# Patient Record
Sex: MALE | Race: WHITE | Employment: OTHER | URBAN - METROPOLITAN AREA
[De-identification: names, ages, dates, MRNs, and addresses within clinical notes are randomized per-mention and may not be internally consistent; named-entity substitution may affect disease eponyms.]

---

## 2017-01-06 ENCOUNTER — TELEPHONE (OUTPATIENT)
Dept: INTERNAL MEDICINE CLINIC | Facility: CLINIC | Age: 72
End: 2017-01-06

## 2017-01-06 ENCOUNTER — APPOINTMENT (OUTPATIENT)
Dept: LAB | Facility: HOSPITAL | Age: 72
End: 2017-01-06
Attending: INTERNAL MEDICINE
Payer: MEDICARE

## 2017-01-06 DIAGNOSIS — I48.91 ATRIAL FIBRILLATION, UNSPECIFIED TYPE (HCC): ICD-10-CM

## 2017-01-06 DIAGNOSIS — Z79.899 ENCOUNTER FOR LONG-TERM (CURRENT) USE OF HIGH-RISK MEDICATION: ICD-10-CM

## 2017-01-06 LAB
INR BLD: 1.98 (ref 0.89–1.12)
PSA SERPL DL<=0.01 NG/ML-MCNC: 23.2 SECONDS (ref 12.3–14.8)

## 2017-01-06 PROCEDURE — 85610 PROTHROMBIN TIME: CPT

## 2017-01-06 PROCEDURE — 36415 COLL VENOUS BLD VENIPUNCTURE: CPT

## 2017-01-09 ENCOUNTER — TELEPHONE (OUTPATIENT)
Dept: INTERNAL MEDICINE CLINIC | Facility: CLINIC | Age: 72
End: 2017-01-09

## 2017-01-09 RX ORDER — TADALAFIL 10 MG/1
10 TABLET ORAL
Qty: 10 TABLET | Refills: 1 | Status: SHIPPED | OUTPATIENT
Start: 2017-01-09 | End: 2018-08-28

## 2017-01-09 NOTE — TELEPHONE ENCOUNTER
Pt calling to clarify  Dosage of medications on coumadin reviewed with pt 10mg 3 times a week and 9 mg 4 times a week  Pt also requesting script for cialis [facility] with refill pt aware insurance maybe an issue pt will pay out of pocket.

## 2017-02-01 ENCOUNTER — APPOINTMENT (OUTPATIENT)
Dept: LAB | Facility: HOSPITAL | Age: 72
End: 2017-02-01
Attending: INTERNAL MEDICINE
Payer: MEDICARE

## 2017-02-01 ENCOUNTER — TELEPHONE (OUTPATIENT)
Dept: INTERNAL MEDICINE CLINIC | Facility: CLINIC | Age: 72
End: 2017-02-01

## 2017-02-01 DIAGNOSIS — Z79.899 ENCOUNTER FOR LONG-TERM (CURRENT) USE OF HIGH-RISK MEDICATION: ICD-10-CM

## 2017-02-01 DIAGNOSIS — I48.91 ATRIAL FIBRILLATION, UNSPECIFIED TYPE (HCC): ICD-10-CM

## 2017-02-01 LAB
INR BLD: 2.11 (ref 0.89–1.12)
PSA SERPL DL<=0.01 NG/ML-MCNC: 24.4 SECONDS (ref 12.3–14.8)

## 2017-02-01 PROCEDURE — 36415 COLL VENOUS BLD VENIPUNCTURE: CPT

## 2017-02-01 PROCEDURE — 85610 PROTHROMBIN TIME: CPT

## 2017-02-01 RX ORDER — WARFARIN SODIUM 5 MG/1
TABLET ORAL
Qty: 120 TABLET | Refills: 1 | Status: SHIPPED | OUTPATIENT
Start: 2017-02-01 | End: 2017-07-08

## 2017-02-01 NOTE — TELEPHONE ENCOUNTER
----- Message from Su Correia MD sent at 2/1/2017 11:28 AM CST -----  Reanna Cohw, your INR is in the normal range now. So continue the same dose and recheck it in a month. Last OV 9/26/16, pt scheduled Lab and CPX for 2017.

## 2017-03-06 RX ORDER — CANAGLIFLOZIN 300 MG/1
TABLET, FILM COATED ORAL
Qty: 90 TABLET | Refills: 1 | Status: SHIPPED | OUTPATIENT
Start: 2017-03-06 | End: 2017-09-13

## 2017-03-08 ENCOUNTER — NURSE ONLY (OUTPATIENT)
Dept: INTERNAL MEDICINE CLINIC | Facility: CLINIC | Age: 72
End: 2017-03-08

## 2017-03-08 DIAGNOSIS — Z12.5 SCREENING PSA (PROSTATE SPECIFIC ANTIGEN): ICD-10-CM

## 2017-03-08 DIAGNOSIS — I10 ESSENTIAL HYPERTENSION: ICD-10-CM

## 2017-03-08 DIAGNOSIS — L97.509 TYPE 2 DIABETES MELLITUS WITH FOOT ULCER, UNSPECIFIED LONG TERM INSULIN USE STATUS: ICD-10-CM

## 2017-03-08 DIAGNOSIS — E11.621 TYPE 2 DIABETES MELLITUS WITH FOOT ULCER, UNSPECIFIED LONG TERM INSULIN USE STATUS: ICD-10-CM

## 2017-03-08 DIAGNOSIS — E78.00 PURE HYPERCHOLESTEROLEMIA: ICD-10-CM

## 2017-03-08 DIAGNOSIS — Z00.00 ROUTINE GENERAL MEDICAL EXAMINATION AT A HEALTH CARE FACILITY: Primary | ICD-10-CM

## 2017-03-08 LAB
25-HYDROXYVITAMIN D (TOTAL): 35 NG/ML (ref 30–100)
ALBUMIN SERPL-MCNC: 4.1 G/DL (ref 3.5–4.8)
ALP LIVER SERPL-CCNC: 50 U/L (ref 45–117)
ALT SERPL-CCNC: 30 U/L (ref 17–63)
AMB EXT CHOLESTEROL, TOTAL: 232 MG/DL
AMB EXT LDL CHOLESTEROL, DIRECT: 152 MG/DL
AST SERPL-CCNC: 16 U/L (ref 15–41)
BASOPHILS # BLD AUTO: 0.05 X10(3) UL (ref 0–0.1)
BASOPHILS NFR BLD AUTO: 0.6 %
BILIRUB SERPL-MCNC: 0.4 MG/DL (ref 0.1–2)
BUN BLD-MCNC: 14 MG/DL (ref 8–20)
CALCIUM BLD-MCNC: 9.4 MG/DL (ref 8.3–10.3)
CHLORIDE: 104 MMOL/L (ref 101–111)
CO2: 25 MMOL/L (ref 22–32)
COMPLEXED PSA SERPL-MCNC: 2.29 NG/ML (ref 0.01–4)
CREAT BLD-MCNC: 0.97 MG/DL (ref 0.7–1.3)
CREAT UR-SCNC: 82.5 MG/DL
EOSINOPHIL # BLD AUTO: 0.26 X10(3) UL (ref 0–0.3)
EOSINOPHIL NFR BLD AUTO: 3.3 %
ERYTHROCYTE [DISTWIDTH] IN BLOOD BY AUTOMATED COUNT: 13.1 % (ref 11.5–16)
EST. AVERAGE GLUCOSE BLD GHB EST-MCNC: 163 MG/DL (ref 68–126)
GLUCOSE BLD-MCNC: 135 MG/DL (ref 70–99)
HBA1C MFR BLD HPLC: 7.3 % (ref ?–5.7)
HCT VFR BLD AUTO: 49.9 % (ref 37–53)
HGB BLD-MCNC: 16.2 G/DL (ref 13–17)
IMMATURE GRANULOCYTE COUNT: 0.03 X10(3) UL (ref 0–1)
IMMATURE GRANULOCYTE RATIO %: 0.4 %
LYMPHOCYTES # BLD AUTO: 1.83 X10(3) UL (ref 0.9–4)
LYMPHOCYTES NFR BLD AUTO: 23.4 %
M PROTEIN MFR SERPL ELPH: 7.7 G/DL (ref 6.1–8.3)
MCH RBC QN AUTO: 30.6 PG (ref 27–33.2)
MCHC RBC AUTO-ENTMCNC: 32.5 G/DL (ref 31–37)
MCV RBC AUTO: 94.3 FL (ref 80–99)
MICROALBUMIN UR-MCNC: 2.41 MG/DL
MICROALBUMIN/CREAT 24H UR-RTO: 29.2 UG/MG (ref ?–30)
MONOCYTES # BLD AUTO: 0.76 X10(3) UL (ref 0.1–0.6)
MONOCYTES NFR BLD AUTO: 9.7 %
NEUTROPHIL ABS PRELIM: 4.89 X10 (3) UL (ref 1.3–6.7)
NEUTROPHILS # BLD AUTO: 4.89 X10(3) UL (ref 1.3–6.7)
NEUTROPHILS NFR BLD AUTO: 62.6 %
PLATELET # BLD AUTO: 161 10(3)UL (ref 150–450)
POTASSIUM SERPL-SCNC: 4.6 MMOL/L (ref 3.6–5.1)
RBC # BLD AUTO: 5.29 X10(6)UL (ref 3.8–5.8)
RED CELL DISTRIBUTION WIDTH-SD: 45.1 FL (ref 35.1–46.3)
SODIUM SERPL-SCNC: 138 MMOL/L (ref 136–144)
TSI SER-ACNC: 1.26 MIU/ML (ref 0.35–5.5)
WBC # BLD AUTO: 7.8 X10(3) UL (ref 4–13)

## 2017-03-08 PROCEDURE — 83704 LIPOPROTEIN BLD QUAN PART: CPT | Performed by: INTERNAL MEDICINE

## 2017-03-08 PROCEDURE — 84443 ASSAY THYROID STIM HORMONE: CPT | Performed by: INTERNAL MEDICINE

## 2017-03-08 PROCEDURE — 80053 COMPREHEN METABOLIC PANEL: CPT | Performed by: INTERNAL MEDICINE

## 2017-03-08 PROCEDURE — 93000 ELECTROCARDIOGRAM COMPLETE: CPT | Performed by: INTERNAL MEDICINE

## 2017-03-08 PROCEDURE — 82043 UR ALBUMIN QUANTITATIVE: CPT | Performed by: INTERNAL MEDICINE

## 2017-03-08 PROCEDURE — 82306 VITAMIN D 25 HYDROXY: CPT | Performed by: INTERNAL MEDICINE

## 2017-03-08 PROCEDURE — 99173 VISUAL ACUITY SCREEN: CPT | Performed by: INTERNAL MEDICINE

## 2017-03-08 PROCEDURE — 85025 COMPLETE CBC W/AUTO DIFF WBC: CPT | Performed by: INTERNAL MEDICINE

## 2017-03-08 PROCEDURE — 92551 PURE TONE HEARING TEST AIR: CPT | Performed by: INTERNAL MEDICINE

## 2017-03-08 PROCEDURE — 82570 ASSAY OF URINE CREATININE: CPT | Performed by: INTERNAL MEDICINE

## 2017-03-08 PROCEDURE — 83036 HEMOGLOBIN GLYCOSYLATED A1C: CPT | Performed by: INTERNAL MEDICINE

## 2017-03-13 LAB
HDL CHOLESTEROL: 44 MG/DL
HDL PARTICLE NUMBER: 27.9 UMOL/L
HDL SIZE: 8.5 NM
LARGE HDL PARTICLE NUMBER: 2.4 UMOL/L
LARGE VLDL PARTICLE NUMBER: 9 NMOL/L
LDL CHOLESTEROL: 152 MG/DL
LDL PARTICLE NUMBER BY NMR: 1827 NMOL/L
LDL PARTICLE SIZE: 20.6 NM
LDL SIZE: 20.6 NM
LP INSULIN RESISTANCE SCORE: 89
SMALL LDL PARTICLE NUMBER: 790 NMOL/L
SMALL LDL-P: 790 NMOL/L
TOTAL CHOLESTEROL: 232 MG/DL
TRIGLYCERIDES: 179 MG/DL
VLDL SIZE: 58 NM

## 2017-03-16 ENCOUNTER — OFFICE VISIT (OUTPATIENT)
Dept: INTERNAL MEDICINE CLINIC | Facility: CLINIC | Age: 72
End: 2017-03-16

## 2017-03-16 VITALS
DIASTOLIC BLOOD PRESSURE: 82 MMHG | WEIGHT: 212 LBS | TEMPERATURE: 98 F | BODY MASS INDEX: 27.5 KG/M2 | HEIGHT: 73.5 IN | SYSTOLIC BLOOD PRESSURE: 110 MMHG | RESPIRATION RATE: 16 BRPM | HEART RATE: 64 BPM

## 2017-03-16 DIAGNOSIS — Z00.01 ENCOUNTER FOR GENERAL ADULT MEDICAL EXAMINATION WITH ABNORMAL FINDINGS: Primary | ICD-10-CM

## 2017-03-16 DIAGNOSIS — E04.2 MULTINODULAR GOITER: ICD-10-CM

## 2017-03-16 DIAGNOSIS — I10 ESSENTIAL HYPERTENSION: ICD-10-CM

## 2017-03-16 DIAGNOSIS — E11.65 TYPE 2 DIABETES MELLITUS WITH HYPERGLYCEMIA, WITHOUT LONG-TERM CURRENT USE OF INSULIN (HCC): ICD-10-CM

## 2017-03-16 DIAGNOSIS — Z79.01 LONG TERM (CURRENT) USE OF ANTICOAGULANTS: ICD-10-CM

## 2017-03-16 DIAGNOSIS — H25.091 AGE-RELATED INCIPIENT CATARACT OF RIGHT EYE: ICD-10-CM

## 2017-03-16 DIAGNOSIS — I48.20 CHRONIC ATRIAL FIBRILLATION (HCC): ICD-10-CM

## 2017-03-16 DIAGNOSIS — E78.00 PURE HYPERCHOLESTEROLEMIA: ICD-10-CM

## 2017-03-16 PROBLEM — E11.9 TYPE II DIABETES MELLITUS (HCC): Status: ACTIVE | Noted: 2017-03-16

## 2017-03-16 PROBLEM — H26.9 CATARACT, RIGHT EYE: Status: ACTIVE | Noted: 2017-03-16

## 2017-03-16 PROCEDURE — G0439 PPPS, SUBSEQ VISIT: HCPCS | Performed by: INTERNAL MEDICINE

## 2017-03-16 RX ORDER — ENALAPRIL MALEATE 2.5 MG/1
2.5 TABLET ORAL DAILY
Qty: 90 TABLET | Refills: 3 | Status: SHIPPED | OUTPATIENT
Start: 2017-03-16 | End: 2018-03-05

## 2017-03-16 NOTE — PROGRESS NOTES
Joe name: Patricia Yoon  /Sex/Age:  66year old male  Enc.  Date: 3/16/2017     Visit Information:  CC: Patricia Yoon is here for the [facility] Wellness Exam.  We are happy to be caring for you, Eduar Carlson, and are ready to support your efforts to optimi this lipid panel was done. You will repeat it now that you are back on it.         Lab Results  Component Value Date   CHOLEST 232* 03/08/2017   TRIG 228* 09/23/2014   HDL 44 03/08/2017   * 03/08/2017   VLDL 46* 09/23/2014   TCHDLRATIO 3.7 09/23/201 Oral Tablet 24 Hr Take 0.5 tablets (50 mg total) by mouth once daily. Disp:  Rfl:    WARFARIN SODIUM 1 MG Oral Tab TAKE 4 TABLETS DAILY AS DIRECTED.  Disp: 120 tablet Rfl: 11   [DISCONTINUED] INVOKANA 300 MG Oral Tab TAKE ONE TABLET BY MOUTH DAILY Disp: 90 equivalent per week         Drug Use: No    Sexual Activity: Not on file   Not on file  Other Topics Concern    Caffeine Concern Yes    Exercise Yes     Social History Narrative        HOBBIES: Golf, woodworking      Health Maintenance:  1.  Colonoscopy:201 able to afford your medications?: Yes    Hearing Problems?: No     Functional Status     Hearing Problems?: No    Vision Problems? : No    Difficulty walking?: No    Difficulty dressing or bathing?: No    Problems with daily activities? : No    Memory Prob stool, GERD. Genitourinary: Negative for decreased stream, hesitancy, erectile dysfunction. Musculoskeletal: Negative for myalgias, back pain, joint swelling, arthralgias and gait problem. Exts: No leg or ankle swelling.    Skin: Negative for color juan manuel Date: 03/08/2017   Value: 135* Ref Range 70-99 mg/dL Status: Final   BUN Date: 03/08/2017   Value: 14  Ref Range 8-20 mg/dL Status: Final   Creatinine Date: 03/08/2017   Value: 0.97  Ref Range 0.70-1.30 mg/dL Status: Final   GFR Date: 03/08/2017   Value: 7 formula recommended by the American Diabetes Association. eAG levels reflect the long term average glucose and may not correlate with random or fasting glucose levels since these represent specific points in time.           TSH Date: 03/08/2017   Value: 1.2 Final    Comment:  ----------------------------------------------------------                   ** INTERPRETATIVE INFORMATION**                   PARTICLE CONCENTRATION AND SIZE                      <--Lower CVD Risk   Higher CVD Risk-->    LDL AND HDL PAR [address]  [phone]  Madelin Benoit MD  Performed at: [facility] [place] [address], [place], [address] [place] [address]   Triglycerides Date: 03/08/2017   Value: 179* Ref Range 0-149 mg/dL Status: Final    Comment: [address] [id number] [address], [facility] Minor [facility]p Insulin Resistance Score Date: 03/08/2017   Value: 89* Ref Range <=45 Status: Final    Comment:  ----------------------------------------------------------               INSULIN RESISTANCE / DIABETES RISK MARKERS clinical assessment. Neither  the LP-IR score nor the subclasses listed above have been cleared  by the [employer] and Drug Administration.   [facility] [address]  [phone]  Ever Pedro 03/08/2017   Value: 7.8  Ref Range 4.0-13.0 x10(3) uL Status: Final   RBC Date: 03/08/2017   Value: 5.29  Ref Range 3.80-5.80 x10(6)uL Status: Final   HGB Date: 03/08/2017   Value: 16.2  Ref Range 13.0-17.0 g/dL Status: Final   HCT Date: 03/08/2017   Value (primary encounter diagnosis)  Plan: CHARY PE    (I10) Essential hypertension, controlled  Plan: Continue Metoprolol    (I48.2) Chronic atrial fibrillation ([address] rate controlled  (Z79.01) Long term (current) use of anticoagulants  Plan: Continue Metolprol these years, Mehdi De La O. I wish you continued good health and happiness in the coming years. Discussion/Summary:  Patient/Caregiver Education: There are no barriers to learning. Medical education done. Nutritional education done.   Outcome: Patient

## 2017-03-22 ENCOUNTER — APPOINTMENT (OUTPATIENT)
Dept: LAB | Facility: HOSPITAL | Age: 72
End: 2017-03-22
Attending: INTERNAL MEDICINE
Payer: MEDICARE

## 2017-03-22 DIAGNOSIS — E78.00 PURE HYPERCHOLESTEROLEMIA: ICD-10-CM

## 2017-03-22 PROCEDURE — 36415 COLL VENOUS BLD VENIPUNCTURE: CPT

## 2017-03-22 PROCEDURE — 83704 LIPOPROTEIN BLD QUAN PART: CPT

## 2017-03-24 LAB
HDL CHOLESTEROL: 37 MG/DL
HDL PARTICLE NUMBER: 25.7 UMOL/L
HDL SIZE: 8.4 NM
LARGE HDL PARTICLE NUMBER: 1.7 UMOL/L
LARGE VLDL PARTICLE NUMBER: 11.3 NMOL/L
LDL CHOLESTEROL: 104 MG/DL
LDL PARTICLE NUMBER BY NMR: 1710 NMOL/L
LDL PARTICLE SIZE: 20 NM
LDL SIZE: 20 NM
LP INSULIN RESISTANCE SCORE: 91
SMALL LDL PARTICLE NUMBER: 1193 NMOL/L
SMALL LDL-P: 1193 NMOL/L
TOTAL CHOLESTEROL: 182 MG/DL
TRIGLYCERIDES: 203 MG/DL
VLDL SIZE: 56.1 NM

## 2017-05-01 RX ORDER — GLIPIZIDE 10 MG/1
TABLET, FILM COATED, EXTENDED RELEASE ORAL
Qty: 180 TABLET | Refills: 0 | Status: SHIPPED | OUTPATIENT
Start: 2017-05-01 | End: 2017-07-26

## 2017-05-18 ENCOUNTER — APPOINTMENT (OUTPATIENT)
Dept: LAB | Age: 72
End: 2017-05-18
Attending: INTERNAL MEDICINE
Payer: MEDICARE

## 2017-05-18 ENCOUNTER — OFFICE VISIT (OUTPATIENT)
Dept: INTERNAL MEDICINE CLINIC | Facility: CLINIC | Age: 72
End: 2017-05-18

## 2017-05-18 VITALS
TEMPERATURE: 98 F | SYSTOLIC BLOOD PRESSURE: 96 MMHG | HEIGHT: 75 IN | RESPIRATION RATE: 16 BRPM | BODY MASS INDEX: 26.49 KG/M2 | DIASTOLIC BLOOD PRESSURE: 62 MMHG | HEART RATE: 64 BPM | WEIGHT: 213 LBS

## 2017-05-18 DIAGNOSIS — Z79.01 CHRONIC ANTICOAGULATION: ICD-10-CM

## 2017-05-18 DIAGNOSIS — E11.9 TYPE 2 DIABETES MELLITUS WITHOUT COMPLICATION, WITHOUT LONG-TERM CURRENT USE OF INSULIN (HCC): ICD-10-CM

## 2017-05-18 DIAGNOSIS — I48.20 CHRONIC ATRIAL FIBRILLATION (HCC): ICD-10-CM

## 2017-05-18 DIAGNOSIS — Z12.11 ENCOUNTER FOR SCREENING COLONOSCOPY: ICD-10-CM

## 2017-05-18 DIAGNOSIS — I48.20 CHRONIC ATRIAL FIBRILLATION (HCC): Primary | ICD-10-CM

## 2017-05-18 DIAGNOSIS — E78.00 PURE HYPERCHOLESTEROLEMIA: ICD-10-CM

## 2017-05-18 PROCEDURE — 85610 PROTHROMBIN TIME: CPT

## 2017-05-18 PROCEDURE — 99214 OFFICE O/P EST MOD 30 MIN: CPT | Performed by: INTERNAL MEDICINE

## 2017-05-18 NOTE — PROGRESS NOTES
Anil Jackson is a 67year old male.   Patient presents with:  Establish Care: Colonoscopy due  Diabetes: No refills needed, No diabetic retnopathy   High Cholesterol: on crestor but last labs done off medication for a few weeks (he was travelling)      HPI needed for Erectile Dysfunction. Disp: 10 tablet Rfl: 1   JANUMET  MG Oral Tab TAKE 1 TABLET BY MOUTH 2 (TWO) TIMES DAILY WITH MEALS.  Disp: 180 tablet Rfl: 1   Metoprolol Succinate  MG Oral Tablet 24 Hr Take 0.5 tablets (50 mg total) by mouth GENERAL HEALTH: no fevers or chills  SKIN: denies any unusual skin lesions or rashes  RESPIRATORY: no cough  CARDIOVASCULAR: denies chest pain   GI: denies abdominal pain   : no dysuria  MUSCULOSKELETAL:  No arthralgias or myalgias  NEURO: denies heada visit.      The patient indicates understanding of these issues and agrees to the plan.

## 2017-05-31 RX ORDER — ROSUVASTATIN CALCIUM 20 MG/1
TABLET, COATED ORAL
Qty: 90 TABLET | Refills: 3 | Status: SHIPPED | OUTPATIENT
Start: 2017-05-31 | End: 2018-05-31

## 2017-07-07 ENCOUNTER — APPOINTMENT (OUTPATIENT)
Dept: LAB | Age: 72
End: 2017-07-07
Attending: INTERNAL MEDICINE
Payer: MEDICARE

## 2017-07-07 DIAGNOSIS — Z79.01 CHRONIC ANTICOAGULATION: ICD-10-CM

## 2017-07-07 DIAGNOSIS — I48.20 CHRONIC ATRIAL FIBRILLATION (HCC): ICD-10-CM

## 2017-07-07 LAB
INR BLD: 1.96 (ref 0.89–1.11)
PSA SERPL DL<=0.01 NG/ML-MCNC: 22.6 SECONDS (ref 12–14.3)

## 2017-07-07 PROCEDURE — 85610 PROTHROMBIN TIME: CPT

## 2017-07-08 ENCOUNTER — TELEPHONE (OUTPATIENT)
Dept: INTERNAL MEDICINE CLINIC | Facility: CLINIC | Age: 72
End: 2017-07-08

## 2017-07-08 RX ORDER — WARFARIN SODIUM 5 MG/1
TABLET ORAL
Qty: 120 TABLET | Refills: 1 | Status: SHIPPED | OUTPATIENT
Start: 2017-07-08 | End: 2017-09-13

## 2017-07-08 NOTE — TELEPHONE ENCOUNTER
Spoke with patient and informed to change coumadin dose to 10mg 4 times weekly and 9 mg 3 times weekly. Recheck INR in 2 weeks. Patient verbalized understanding. Refill for 5 mg warfarin tabs provided as requested.

## 2017-07-24 ENCOUNTER — APPOINTMENT (OUTPATIENT)
Dept: LAB | Age: 72
End: 2017-07-24
Attending: INTERNAL MEDICINE
Payer: MEDICARE

## 2017-07-24 DIAGNOSIS — I48.20 CHRONIC ATRIAL FIBRILLATION (HCC): ICD-10-CM

## 2017-07-24 DIAGNOSIS — E11.9 TYPE 2 DIABETES MELLITUS WITHOUT COMPLICATION, WITHOUT LONG-TERM CURRENT USE OF INSULIN (HCC): ICD-10-CM

## 2017-07-24 DIAGNOSIS — Z79.01 CHRONIC ANTICOAGULATION: ICD-10-CM

## 2017-07-24 DIAGNOSIS — E78.00 PURE HYPERCHOLESTEROLEMIA: ICD-10-CM

## 2017-07-24 LAB
CHOLEST SMN-MCNC: 162 MG/DL (ref ?–200)
EST. AVERAGE GLUCOSE BLD GHB EST-MCNC: 154 MG/DL (ref 68–126)
HBA1C MFR BLD HPLC: 7 % (ref ?–5.7)
HDLC SERPL-MCNC: 45 MG/DL (ref 45–?)
HDLC SERPL: 3.6 {RATIO} (ref ?–4.97)
INR BLD: 1.92 (ref 0.89–1.11)
LDLC SERPL CALC-MCNC: 84 MG/DL (ref ?–130)
LDLC SERPL-MCNC: 33 MG/DL (ref 5–40)
NONHDLC SERPL-MCNC: 117 MG/DL (ref ?–130)
PSA SERPL DL<=0.01 NG/ML-MCNC: 22.2 SECONDS (ref 12–14.3)
TRIGLYCERIDES: 167 MG/DL (ref ?–150)

## 2017-07-24 PROCEDURE — 83036 HEMOGLOBIN GLYCOSYLATED A1C: CPT

## 2017-07-24 PROCEDURE — 85610 PROTHROMBIN TIME: CPT

## 2017-07-24 PROCEDURE — 80061 LIPID PANEL: CPT

## 2017-07-25 RX ORDER — WARFARIN SODIUM 1 MG/1
TABLET ORAL
Qty: 180 TABLET | Refills: 1 | Status: SHIPPED | OUTPATIENT
Start: 2017-07-25 | End: 2018-06-12

## 2017-07-25 RX ORDER — GLIPIZIDE 10 MG/1
TABLET, FILM COATED, EXTENDED RELEASE ORAL
OUTPATIENT
Start: 2017-07-25

## 2017-07-26 RX ORDER — GLIPIZIDE 10 MG/1
TABLET, FILM COATED, EXTENDED RELEASE ORAL
Qty: 180 TABLET | Refills: 1 | Status: SHIPPED | OUTPATIENT
Start: 2017-07-26 | End: 2018-01-26

## 2017-07-26 NOTE — TELEPHONE ENCOUNTER
LOV: 5/18/17   FOV: nfv  LAST LABS: 7/24/17 a1c 7.0  LAST RX: 5/1/17 #180 no refills   PER PROTOCOL:

## 2017-08-16 ENCOUNTER — APPOINTMENT (OUTPATIENT)
Dept: LAB | Age: 72
End: 2017-08-16
Attending: INTERNAL MEDICINE
Payer: MEDICARE

## 2017-08-16 DIAGNOSIS — Z79.01 CHRONIC ANTICOAGULATION: ICD-10-CM

## 2017-08-16 DIAGNOSIS — I48.20 CHRONIC ATRIAL FIBRILLATION (HCC): ICD-10-CM

## 2017-08-16 LAB
INR BLD: 2.05 (ref 0.89–1.11)
PSA SERPL DL<=0.01 NG/ML-MCNC: 23.4 SECONDS (ref 12–14.3)

## 2017-08-16 PROCEDURE — 85610 PROTHROMBIN TIME: CPT

## 2017-09-13 ENCOUNTER — APPOINTMENT (OUTPATIENT)
Dept: LAB | Age: 72
End: 2017-09-13
Attending: INTERNAL MEDICINE
Payer: MEDICARE

## 2017-09-13 ENCOUNTER — OFFICE VISIT (OUTPATIENT)
Dept: INTERNAL MEDICINE CLINIC | Facility: CLINIC | Age: 72
End: 2017-09-13

## 2017-09-13 VITALS
RESPIRATION RATE: 16 BRPM | TEMPERATURE: 98 F | WEIGHT: 207 LBS | SYSTOLIC BLOOD PRESSURE: 110 MMHG | DIASTOLIC BLOOD PRESSURE: 60 MMHG | BODY MASS INDEX: 25.74 KG/M2 | HEART RATE: 76 BPM | HEIGHT: 75 IN

## 2017-09-13 DIAGNOSIS — Z23 NEED FOR INFLUENZA VACCINATION: ICD-10-CM

## 2017-09-13 DIAGNOSIS — I10 ESSENTIAL HYPERTENSION: ICD-10-CM

## 2017-09-13 DIAGNOSIS — I48.20 CHRONIC ATRIAL FIBRILLATION (HCC): ICD-10-CM

## 2017-09-13 DIAGNOSIS — E78.5 DYSLIPIDEMIA: ICD-10-CM

## 2017-09-13 DIAGNOSIS — F17.290 CIGAR SMOKER: ICD-10-CM

## 2017-09-13 DIAGNOSIS — E11.9 TYPE 2 DIABETES MELLITUS WITHOUT COMPLICATION, WITHOUT LONG-TERM CURRENT USE OF INSULIN (HCC): Primary | ICD-10-CM

## 2017-09-13 LAB
INR BLD: 2.08 (ref 0.89–1.11)
PSA SERPL DL<=0.01 NG/ML-MCNC: 23.7 SECONDS (ref 12–14.3)

## 2017-09-13 PROCEDURE — 99214 OFFICE O/P EST MOD 30 MIN: CPT | Performed by: INTERNAL MEDICINE

## 2017-09-13 PROCEDURE — 85610 PROTHROMBIN TIME: CPT

## 2017-09-13 PROCEDURE — 90653 IIV ADJUVANT VACCINE IM: CPT | Performed by: INTERNAL MEDICINE

## 2017-09-13 PROCEDURE — G0008 ADMIN INFLUENZA VIRUS VAC: HCPCS | Performed by: INTERNAL MEDICINE

## 2017-09-13 RX ORDER — WARFARIN SODIUM 5 MG/1
TABLET ORAL
Qty: 120 TABLET | Refills: 1 | Status: SHIPPED | OUTPATIENT
Start: 2017-09-13 | End: 2018-01-26

## 2017-09-13 NOTE — PROGRESS NOTES
Kristen Rios is a 67year old male. Patient presents with:   Follow - Up: for Cholesterol  Diabetes  Atrial Fibrillation      HPI:     Patient with DM2, dyslipidemia, chronic a fib here for f/u-  Chronic a fib-  on coumadin 10mg 5 days a week and 9mg 2 da MG Oral Tab Take 1 tablet (10 mg total) by mouth daily as needed for Erectile Dysfunction. Disp: 10 tablet Rfl: 1   Metoprolol Succinate  MG Oral Tablet 24 Hr Take 0.5 tablets (50 mg total) by mouth once daily.  Disp:  Rfl:    Clobetasol Propionate (T denies chest pain on exertion, no palpatations  GI: denies abdominal pain   : no dysuria, hematuria  NEURO: denies headaches, dizziness, focal weakness  PSYCH: No reported depression or anxiety  HEME: No adenopathy      EXAM:   /60 (BP Location: [facility]    Return in about 3 months (around 12/13/2017). There are no Patient Instructions on file for this visit. The patient indicates understanding of these issues and agrees to the plan.

## 2017-10-10 ENCOUNTER — TELEPHONE (OUTPATIENT)
Dept: INTERNAL MEDICINE CLINIC | Facility: CLINIC | Age: 72
End: 2017-10-10

## 2017-10-10 NOTE — TELEPHONE ENCOUNTER
[facility] questioning if Coumadin can be held 4 days prior to c-scope. Date of procedure is still to be determined. Please advise, per GI, ASA does not need to be help. Please review form and sing so it can be faxed back.

## 2017-10-10 NOTE — TELEPHONE ENCOUNTER
Per TB, can stop Coumadin 4 days prior, to resume the day after c-scope. Form faxed back to [facility] then sent to scan.

## 2017-11-06 ENCOUNTER — LAB ENCOUNTER (OUTPATIENT)
Dept: LAB | Age: 72
End: 2017-11-06
Attending: INTERNAL MEDICINE
Payer: MEDICARE

## 2017-11-06 DIAGNOSIS — Z79.01 CHRONIC ANTICOAGULATION: ICD-10-CM

## 2017-11-06 DIAGNOSIS — I48.20 CHRONIC ATRIAL FIBRILLATION (HCC): ICD-10-CM

## 2017-11-06 PROCEDURE — 85610 PROTHROMBIN TIME: CPT

## 2017-12-27 ENCOUNTER — LAB ENCOUNTER (OUTPATIENT)
Dept: LAB | Age: 72
End: 2017-12-27
Attending: INTERNAL MEDICINE
Payer: MEDICARE

## 2017-12-27 ENCOUNTER — OFFICE VISIT (OUTPATIENT)
Dept: INTERNAL MEDICINE CLINIC | Facility: CLINIC | Age: 72
End: 2017-12-27

## 2017-12-27 VITALS
HEART RATE: 72 BPM | WEIGHT: 217 LBS | DIASTOLIC BLOOD PRESSURE: 66 MMHG | SYSTOLIC BLOOD PRESSURE: 126 MMHG | RESPIRATION RATE: 16 BRPM | HEIGHT: 75 IN | BODY MASS INDEX: 26.98 KG/M2

## 2017-12-27 DIAGNOSIS — E11.9 TYPE 2 DIABETES MELLITUS WITHOUT COMPLICATION, WITHOUT LONG-TERM CURRENT USE OF INSULIN (HCC): ICD-10-CM

## 2017-12-27 DIAGNOSIS — I48.20 CHRONIC ATRIAL FIBRILLATION (HCC): Primary | ICD-10-CM

## 2017-12-27 DIAGNOSIS — I10 ESSENTIAL HYPERTENSION: ICD-10-CM

## 2017-12-27 DIAGNOSIS — I48.20 CHRONIC ATRIAL FIBRILLATION (HCC): ICD-10-CM

## 2017-12-27 PROCEDURE — 99214 OFFICE O/P EST MOD 30 MIN: CPT | Performed by: INTERNAL MEDICINE

## 2017-12-27 PROCEDURE — 83036 HEMOGLOBIN GLYCOSYLATED A1C: CPT

## 2017-12-27 PROCEDURE — 85610 PROTHROMBIN TIME: CPT

## 2017-12-27 NOTE — PROGRESS NOTES
Magui Owen is a 67year old male. Patient presents with:   Follow - Up      HPI:     Patient here for f/u-  Chronic a fib-   INR due now, no bleeding problems, no cardiac complaints  DM2 - HGBA1C improved to 7 last OV, did not bring his log but says FBG Tablet 24 Hr Take 0.5 tablets (50 mg total) by mouth once daily. Disp:  Rfl:    Clobetasol Propionate (TEMOVATE) 0.05 % Apply Externally Ointment Apply 1 Application topically as needed.  Disp:  Rfl: 0   Omega-3 Fatty Acids (FISH OIL CONCENTRATE OR) Take 1 headaches  RHEUM: No reported joint swelling  HEME: No adenopathy      EXAM:   /66   Pulse 72   Resp 16   Ht 75\"   Wt 217 lb   BMI 27.12 kg/m²   GENERAL: well developed, well nourished,in no apparent distress  SKIN: no rashes,no suspicious lesions

## 2018-01-26 DIAGNOSIS — I48.20 CHRONIC ATRIAL FIBRILLATION (HCC): ICD-10-CM

## 2018-01-26 RX ORDER — GLIPIZIDE 10 MG/1
TABLET, FILM COATED, EXTENDED RELEASE ORAL
Qty: 180 TABLET | Refills: 0 | Status: SHIPPED | OUTPATIENT
Start: 2018-01-26 | End: 2018-05-03

## 2018-01-26 RX ORDER — WARFARIN SODIUM 5 MG/1
TABLET ORAL
Qty: 120 TABLET | Refills: 1 | Status: SHIPPED | OUTPATIENT
Start: 2018-01-26 | End: 2018-06-30

## 2018-01-26 NOTE — TELEPHONE ENCOUNTER
LOV: 12/27/17  Future office visit: No upcoming visit   Last labs: 7/24/17 A1C Lipid 12/27/17 PT A1C   Last RX: Warfarin 9/13/17 #120 #1 Refill  Per protocol: Route to provider

## 2018-03-05 DIAGNOSIS — E11.9 TYPE 2 DIABETES MELLITUS WITHOUT COMPLICATION, WITHOUT LONG-TERM CURRENT USE OF INSULIN (HCC): ICD-10-CM

## 2018-03-05 RX ORDER — CANAGLIFLOZIN 300 MG/1
1 TABLET, FILM COATED ORAL
Qty: 90 TABLET | Refills: 0 | Status: SHIPPED | OUTPATIENT
Start: 2018-03-05 | End: 2018-06-11

## 2018-03-05 RX ORDER — ENALAPRIL MALEATE 2.5 MG/1
2.5 TABLET ORAL DAILY
Qty: 90 TABLET | Refills: 0 | Status: SHIPPED | OUTPATIENT
Start: 2018-03-05 | End: 2018-05-31

## 2018-03-12 ENCOUNTER — TELEPHONE (OUTPATIENT)
Dept: INTERNAL MEDICINE CLINIC | Facility: CLINIC | Age: 73
End: 2018-03-12

## 2018-03-12 NOTE — TELEPHONE ENCOUNTER
AWV with Dr Antoinette Gould on 3/23/18. Patient wants to know if he needs fasting labs done before hand? If so, please send orders to [facility]. Patient would like a callback. Can leave voice message on home machine.

## 2018-03-12 NOTE — TELEPHONE ENCOUNTER
I will decide on labs after OV, let patient know  Medicare does not allow screening labs so I like to do labs after visit to link diagnosis with labs

## 2018-03-19 ENCOUNTER — MED REC SCAN ONLY (OUTPATIENT)
Dept: INTERNAL MEDICINE CLINIC | Facility: CLINIC | Age: 73
End: 2018-03-19

## 2018-03-23 ENCOUNTER — OFFICE VISIT (OUTPATIENT)
Dept: INTERNAL MEDICINE CLINIC | Facility: CLINIC | Age: 73
End: 2018-03-23

## 2018-03-23 VITALS
TEMPERATURE: 98 F | RESPIRATION RATE: 12 BRPM | WEIGHT: 215 LBS | SYSTOLIC BLOOD PRESSURE: 120 MMHG | BODY MASS INDEX: 26.73 KG/M2 | HEIGHT: 75 IN | DIASTOLIC BLOOD PRESSURE: 76 MMHG | HEART RATE: 72 BPM

## 2018-03-23 DIAGNOSIS — Z12.5 ENCOUNTER FOR SPECIAL SCREENING EXAMINATION FOR NEOPLASM OF PROSTATE: ICD-10-CM

## 2018-03-23 DIAGNOSIS — Z00.00 WELLNESS EXAMINATION: Primary | ICD-10-CM

## 2018-03-23 DIAGNOSIS — F17.290 CIGAR SMOKER: ICD-10-CM

## 2018-03-23 DIAGNOSIS — E11.40 TYPE 2 DIABETES MELLITUS WITH DIABETIC NEUROPATHY, WITHOUT LONG-TERM CURRENT USE OF INSULIN (HCC): ICD-10-CM

## 2018-03-23 DIAGNOSIS — I48.20 CHRONIC ATRIAL FIBRILLATION (HCC): ICD-10-CM

## 2018-03-23 DIAGNOSIS — I10 ESSENTIAL HYPERTENSION: ICD-10-CM

## 2018-03-23 DIAGNOSIS — E78.5 DYSLIPIDEMIA: ICD-10-CM

## 2018-03-23 PROCEDURE — G0439 PPPS, SUBSEQ VISIT: HCPCS | Performed by: INTERNAL MEDICINE

## 2018-03-23 NOTE — PROGRESS NOTES
Deven Callaway is a 67year old male who presents for a Medicare Annual Wellness visit.     Patient here for wellness, feels great  Chronic a fib-   no bleeding problems on coumadin, no cardiac complaints, INR due now, plans to see Dr. Ousmane John in summer for (TWO) TIMES DAILY WITH MEALS. Disp: 180 tablet Rfl: 1   Warfarin Sodium 1 MG Oral Tab Take as directed by physician.  Disp: 180 tablet Rfl: 1   Rosuvastatin Calcium (CRESTOR) 20 MG Oral Tab TAKE ONE TABLET BY MOUTH ONCE DAILY Disp: 90 tablet Rfl: 3   [facility]      In the past six months, have you lost more than 10 pounds without trying?: 2 - No  Has your appetite been poor?: No  Type of Diet: Balanced  How does the patient maintain a good energy level?: Appropriate Exercise  How would you Diabetes Screening     HbgA1C   Annually HGBA1C (%)   Date Value   11/01/2011 7.3 (H)   11/01/2011 7.3 (H)     HgbA1C (%)   Date Value   12/27/2017 8.0 (H)       Fasting Blood Sugar (FSB)Annually   Glucose (mg/dL)   Date Value   03/08/2017 135 (H)   ---- entered on: 3/19/2018   Last Dilated Eye Exam 3/19/2018              ALLERGIES:   No Known Allergies  MEDICAL INFORMATION:   Past Medical History:   Diagnosis Date   • Atrial fibrillation ([facility]    • Basal cell carcinoma of chin 8/17/2015   • Cellulitis of 03/12/2015      Zoster Vaccine Live (Zostavax)                          01/01/2010        SOCIAL HISTORY:   Smoking status: Current Some Day Smoker                                                    Packs/day: 0.00      Years: 0.00      Smokeless tobacco: back  EXTREMITIES: no cyanosis, clubbing or edema  Bilateral barefoot skin diabetic exam is normal, visualized feet and the appearance is normal.  Bilateral monofilament/sensation of both feet is abnormal. +onychomycosis, numbness from ankles down in both preventive care reminders to display for this patient.

## 2018-04-25 ENCOUNTER — LAB ENCOUNTER (OUTPATIENT)
Dept: LAB | Age: 73
End: 2018-04-25
Attending: INTERNAL MEDICINE
Payer: MEDICARE

## 2018-04-25 DIAGNOSIS — I10 ESSENTIAL HYPERTENSION: ICD-10-CM

## 2018-04-25 DIAGNOSIS — E11.40 TYPE 2 DIABETES MELLITUS WITH DIABETIC NEUROPATHY, WITHOUT LONG-TERM CURRENT USE OF INSULIN (HCC): ICD-10-CM

## 2018-04-25 DIAGNOSIS — E78.5 DYSLIPIDEMIA: ICD-10-CM

## 2018-04-25 DIAGNOSIS — F17.290 CIGAR SMOKER: ICD-10-CM

## 2018-04-25 DIAGNOSIS — Z12.5 ENCOUNTER FOR SPECIAL SCREENING EXAMINATION FOR NEOPLASM OF PROSTATE: ICD-10-CM

## 2018-04-25 DIAGNOSIS — I48.20 CHRONIC ATRIAL FIBRILLATION (HCC): ICD-10-CM

## 2018-04-25 PROCEDURE — 82570 ASSAY OF URINE CREATININE: CPT

## 2018-04-25 PROCEDURE — 85610 PROTHROMBIN TIME: CPT

## 2018-04-25 PROCEDURE — 84443 ASSAY THYROID STIM HORMONE: CPT

## 2018-04-25 PROCEDURE — 82043 UR ALBUMIN QUANTITATIVE: CPT

## 2018-04-25 PROCEDURE — 80061 LIPID PANEL: CPT

## 2018-04-25 PROCEDURE — 83036 HEMOGLOBIN GLYCOSYLATED A1C: CPT

## 2018-04-25 PROCEDURE — 85025 COMPLETE CBC W/AUTO DIFF WBC: CPT

## 2018-04-25 PROCEDURE — 80053 COMPREHEN METABOLIC PANEL: CPT

## 2018-04-30 ENCOUNTER — TELEPHONE (OUTPATIENT)
Dept: INTERNAL MEDICINE CLINIC | Facility: CLINIC | Age: 73
End: 2018-04-30

## 2018-05-03 RX ORDER — GLIPIZIDE 10 MG/1
TABLET, FILM COATED, EXTENDED RELEASE ORAL
Qty: 180 TABLET | Refills: 0 | Status: SHIPPED | OUTPATIENT
Start: 2018-05-03 | End: 2018-07-23

## 2018-05-03 NOTE — TELEPHONE ENCOUNTER
LOV: 03/23/2018  Future Visit: No appointment scheduled   Last Labs: 04/25/2018 Hemoglobin A1C, PSA, TSH, CBC, Lipid panel, COMP, Microalb, Prothrombin   Last Rx: 01/26/2018      Per protocol: Last HgBA1C < 7.5

## 2018-05-10 ENCOUNTER — APPOINTMENT (OUTPATIENT)
Dept: LAB | Age: 73
End: 2018-05-10
Attending: INTERNAL MEDICINE
Payer: MEDICARE

## 2018-05-10 DIAGNOSIS — Z79.01 CHRONIC ANTICOAGULATION: ICD-10-CM

## 2018-05-10 DIAGNOSIS — I48.20 CHRONIC ATRIAL FIBRILLATION (HCC): ICD-10-CM

## 2018-05-10 PROCEDURE — 85610 PROTHROMBIN TIME: CPT

## 2018-05-31 RX ORDER — ROSUVASTATIN CALCIUM 20 MG/1
TABLET, COATED ORAL
Qty: 90 TABLET | Refills: 0 | Status: SHIPPED | OUTPATIENT
Start: 2018-05-31 | End: 2018-08-28

## 2018-05-31 RX ORDER — ENALAPRIL MALEATE 2.5 MG/1
2.5 TABLET ORAL DAILY
Qty: 90 TABLET | Refills: 0 | Status: SHIPPED | OUTPATIENT
Start: 2018-05-31 | End: 2018-09-07

## 2018-06-11 DIAGNOSIS — E11.9 TYPE 2 DIABETES MELLITUS WITHOUT COMPLICATION, WITHOUT LONG-TERM CURRENT USE OF INSULIN (HCC): ICD-10-CM

## 2018-06-11 RX ORDER — CANAGLIFLOZIN 300 MG/1
1 TABLET, FILM COATED ORAL
Qty: 90 TABLET | Refills: 0 | Status: SHIPPED | OUTPATIENT
Start: 2018-06-11 | End: 2018-09-07

## 2018-06-11 NOTE — TELEPHONE ENCOUNTER
Protocol failed as a1c not controlled  All others passed  Pt due back this month, no apt on file  Protocol to provider and

## 2018-06-12 RX ORDER — WARFARIN SODIUM 1 MG/1
TABLET ORAL
Qty: 32 TABLET | Refills: 5 | Status: SHIPPED | OUTPATIENT
Start: 2018-06-12 | End: 2018-12-04

## 2018-06-12 NOTE — TELEPHONE ENCOUNTER
[facility]:5/83/4526 with TB  Future Appt: none on file  Last Rx:7/25/2017 #180 w/1  Last Labs: 5/10/2018 INR 2.25 dosing of 10mg x 5 days and 9mg x 2 days  Per protocol to provider

## 2018-06-12 NOTE — TELEPHONE ENCOUNTER
Future Appointments  Date Time Provider Marilyn Encisoi   7/10/2018 11:45 AM Coral Caldwell MD [facility]   7/19/2018 1:00 PM Jesús Arango MD [facility]

## 2018-06-30 DIAGNOSIS — I48.20 CHRONIC ATRIAL FIBRILLATION (HCC): ICD-10-CM

## 2018-07-02 RX ORDER — WARFARIN SODIUM 5 MG/1
TABLET ORAL
Qty: 120 TABLET | Refills: 0 | Status: SHIPPED | OUTPATIENT
Start: 2018-07-02 | End: 2018-09-08

## 2018-07-02 NOTE — TELEPHONE ENCOUNTER
LOV: 3/23/2018   LAST LAB: 4/5/2018 PSA, TSH, CBC,   LAST REFILL   1/26/2018 , 120 TABS , 1 REFILL     PER PROTOCOL ROUTE TO PROVIDER

## 2018-07-10 ENCOUNTER — LAB ENCOUNTER (OUTPATIENT)
Dept: LAB | Age: 73
End: 2018-07-10
Attending: INTERNAL MEDICINE
Payer: MEDICARE

## 2018-07-10 ENCOUNTER — OFFICE VISIT (OUTPATIENT)
Dept: INTERNAL MEDICINE CLINIC | Facility: CLINIC | Age: 73
End: 2018-07-10

## 2018-07-10 VITALS
HEIGHT: 75 IN | RESPIRATION RATE: 16 BRPM | HEART RATE: 72 BPM | DIASTOLIC BLOOD PRESSURE: 62 MMHG | BODY MASS INDEX: 25.61 KG/M2 | SYSTOLIC BLOOD PRESSURE: 100 MMHG | TEMPERATURE: 98 F | WEIGHT: 206 LBS

## 2018-07-10 DIAGNOSIS — D69.6 THROMBOCYTOPENIA (HCC): ICD-10-CM

## 2018-07-10 DIAGNOSIS — I48.20 CHRONIC ATRIAL FIBRILLATION (HCC): ICD-10-CM

## 2018-07-10 DIAGNOSIS — I10 ESSENTIAL HYPERTENSION: ICD-10-CM

## 2018-07-10 DIAGNOSIS — Z71.6 ENCOUNTER FOR SMOKING CESSATION COUNSELING: ICD-10-CM

## 2018-07-10 DIAGNOSIS — E11.40 TYPE 2 DIABETES MELLITUS WITH DIABETIC NEUROPATHY, WITHOUT LONG-TERM CURRENT USE OF INSULIN (HCC): Primary | ICD-10-CM

## 2018-07-10 DIAGNOSIS — E11.40 TYPE 2 DIABETES MELLITUS WITH DIABETIC NEUROPATHY, WITHOUT LONG-TERM CURRENT USE OF INSULIN (HCC): ICD-10-CM

## 2018-07-10 LAB
BASOPHILS # BLD AUTO: 0.03 X10(3) UL (ref 0–0.1)
BASOPHILS NFR BLD AUTO: 0.4 %
BUN BLD-MCNC: 20 MG/DL (ref 8–20)
CALCIUM BLD-MCNC: 9.3 MG/DL (ref 8.3–10.3)
CHLORIDE: 102 MMOL/L (ref 101–111)
CO2: 26 MMOL/L (ref 22–32)
CREAT BLD-MCNC: 1.24 MG/DL (ref 0.7–1.3)
EOSINOPHIL # BLD AUTO: 0.24 X10(3) UL (ref 0–0.3)
EOSINOPHIL NFR BLD AUTO: 2.9 %
ERYTHROCYTE [DISTWIDTH] IN BLOOD BY AUTOMATED COUNT: 13 % (ref 11.5–16)
EST. AVERAGE GLUCOSE BLD GHB EST-MCNC: 166 MG/DL (ref 68–126)
GLUCOSE BLD-MCNC: 198 MG/DL (ref 70–99)
HBA1C MFR BLD HPLC: 7.4 % (ref ?–5.7)
HCT VFR BLD AUTO: 48.6 % (ref 37–53)
HGB BLD-MCNC: 16.2 G/DL (ref 13–17)
IMMATURE GRANULOCYTE COUNT: 0.02 X10(3) UL (ref 0–1)
IMMATURE GRANULOCYTE RATIO %: 0.2 %
INR BLD: 2.17 (ref 0.9–1.1)
LYMPHOCYTES # BLD AUTO: 1.84 X10(3) UL (ref 0.9–4)
LYMPHOCYTES NFR BLD AUTO: 22.5 %
MCH RBC QN AUTO: 30.8 PG (ref 27–33.2)
MCHC RBC AUTO-ENTMCNC: 33.3 G/DL (ref 31–37)
MCV RBC AUTO: 92.4 FL (ref 80–99)
MONOCYTES # BLD AUTO: 0.73 X10(3) UL (ref 0.1–1)
MONOCYTES NFR BLD AUTO: 8.9 %
NEUTROPHIL ABS PRELIM: 5.33 X10 (3) UL (ref 1.3–6.7)
NEUTROPHILS # BLD AUTO: 5.33 X10(3) UL (ref 1.3–6.7)
NEUTROPHILS NFR BLD AUTO: 65.1 %
PLATELET # BLD AUTO: 178 10(3)UL (ref 150–450)
POTASSIUM SERPL-SCNC: 4.7 MMOL/L (ref 3.6–5.1)
PSA SERPL DL<=0.01 NG/ML-MCNC: 24.9 SECONDS (ref 12.4–14.7)
RBC # BLD AUTO: 5.26 X10(6)UL (ref 3.8–5.8)
RED CELL DISTRIBUTION WIDTH-SD: 43.9 FL (ref 35.1–46.3)
SODIUM SERPL-SCNC: 136 MMOL/L (ref 136–144)
WBC # BLD AUTO: 8.2 X10(3) UL (ref 4–13)

## 2018-07-10 PROCEDURE — 80048 BASIC METABOLIC PNL TOTAL CA: CPT

## 2018-07-10 PROCEDURE — 83036 HEMOGLOBIN GLYCOSYLATED A1C: CPT

## 2018-07-10 PROCEDURE — 85610 PROTHROMBIN TIME: CPT

## 2018-07-10 PROCEDURE — 99214 OFFICE O/P EST MOD 30 MIN: CPT | Performed by: INTERNAL MEDICINE

## 2018-07-10 PROCEDURE — 85025 COMPLETE CBC W/AUTO DIFF WBC: CPT

## 2018-07-10 NOTE — PROGRESS NOTES
Ledy Shah is a 75year old male. Patient presents with:   Follow - Up: Room 3 AH - DM UTD , a fib, smoking still      HPI:     Patient here for f/u  DM2 - no log of BG with him, last one yesterday 140, intentionally lost weight playing golf and watchin mouth daily as needed for Erectile Dysfunction. Disp: 10 tablet Rfl: 1   Clobetasol Propionate (TEMOVATE) 0.05 % Apply Externally Ointment Apply 1 Application topically as needed.  Disp:  Rfl: 0   Omega-3 Fatty Acids (FISH OIL CONCENTRATE OR) Take 1 tablet 100/62 (BP Location: Left arm, Patient Position: Sitting, Cuff Size: adult)   Pulse 72   Temp 97.7 °F (36.5 °C) (Oral)   Resp 16   Ht 75\"   Wt 206 lb   BMI 25.75 kg/m²   GENERAL: well developed, NAD  HEENT: atraumatic, normocephalic  NECK: supple,no adeno

## 2018-07-23 RX ORDER — GLIPIZIDE 10 MG/1
TABLET, FILM COATED, EXTENDED RELEASE ORAL
Qty: 180 TABLET | Refills: 0 | Status: SHIPPED | OUTPATIENT
Start: 2018-07-23 | End: 2018-11-05

## 2018-08-28 RX ORDER — TADALAFIL 10 MG
10 TABLET ORAL
Qty: 10 TABLET | OUTPATIENT
Start: 2018-08-28

## 2018-08-28 RX ORDER — ROSUVASTATIN CALCIUM 20 MG/1
TABLET, COATED ORAL
Qty: 90 TABLET | Refills: 0 | Status: SHIPPED | OUTPATIENT
Start: 2018-08-28 | End: 2018-11-29

## 2018-08-29 RX ORDER — TADALAFIL 10 MG
10 TABLET ORAL
Qty: 10 TABLET | Refills: 1 | Status: SHIPPED | OUTPATIENT
Start: 2018-08-29 | End: 2018-11-30 | Stop reason: ALTCHOICE

## 2018-09-07 DIAGNOSIS — E11.9 TYPE 2 DIABETES MELLITUS WITHOUT COMPLICATION, WITHOUT LONG-TERM CURRENT USE OF INSULIN (HCC): ICD-10-CM

## 2018-09-07 RX ORDER — ENALAPRIL MALEATE 2.5 MG/1
2.5 TABLET ORAL DAILY
Qty: 90 TABLET | Refills: 0 | Status: SHIPPED | OUTPATIENT
Start: 2018-09-07 | End: 2018-12-04

## 2018-09-07 RX ORDER — CANAGLIFLOZIN 300 MG/1
1 TABLET, FILM COATED ORAL
Qty: 90 TABLET | Refills: 0 | Status: SHIPPED | OUTPATIENT
Start: 2018-09-07 | End: 2018-12-04

## 2018-09-08 DIAGNOSIS — I48.20 CHRONIC ATRIAL FIBRILLATION (HCC): ICD-10-CM

## 2018-09-10 RX ORDER — WARFARIN SODIUM 5 MG/1
TABLET ORAL
Qty: 120 TABLET | Refills: 0 | Status: SHIPPED | OUTPATIENT
Start: 2018-09-10 | End: 2018-11-15

## 2018-09-10 NOTE — TELEPHONE ENCOUNTER
LOV: 07/10/2018  Upcoming: No appointment scheduled  Rx:  07/02/2018  Labs: 07/10/2018 Prothrombin time, CBC.  Basic metabolic panel, Hemoglobin     Per protocol: sent to provider

## 2018-09-12 ENCOUNTER — MED REC SCAN ONLY (OUTPATIENT)
Dept: INTERNAL MEDICINE CLINIC | Facility: CLINIC | Age: 73
End: 2018-09-12

## 2018-09-26 DIAGNOSIS — E11.9 TYPE 2 DIABETES MELLITUS WITHOUT COMPLICATION, WITHOUT LONG-TERM CURRENT USE OF INSULIN (HCC): ICD-10-CM

## 2018-11-05 ENCOUNTER — APPOINTMENT (OUTPATIENT)
Dept: LAB | Age: 73
End: 2018-11-05
Attending: INTERNAL MEDICINE
Payer: MEDICARE

## 2018-11-05 PROCEDURE — 85610 PROTHROMBIN TIME: CPT

## 2018-11-05 RX ORDER — GLIPIZIDE 10 MG/1
TABLET, FILM COATED, EXTENDED RELEASE ORAL
Qty: 180 TABLET | Refills: 0 | Status: SHIPPED | OUTPATIENT
Start: 2018-11-05 | End: 2019-01-25

## 2018-11-15 DIAGNOSIS — I48.20 CHRONIC ATRIAL FIBRILLATION (HCC): ICD-10-CM

## 2018-11-15 RX ORDER — WARFARIN SODIUM 5 MG/1
TABLET ORAL
Qty: 120 TABLET | Refills: 0 | Status: SHIPPED | OUTPATIENT
Start: 2018-11-15 | End: 2019-01-25

## 2018-11-15 NOTE — TELEPHONE ENCOUNTER
[facility]: 07/10/18 with TB  Future Appt: 11/30/2018   Last Rx:09/10/18 for 120 tablets  Last Labs:11/5/18 within range  10mg x 5 days and 9mg x2 days  Per protocol to provider

## 2018-11-29 RX ORDER — ROSUVASTATIN CALCIUM 20 MG/1
TABLET, COATED ORAL
Qty: 90 TABLET | Refills: 0 | Status: SHIPPED | OUTPATIENT
Start: 2018-11-29 | End: 2019-03-03

## 2018-11-30 ENCOUNTER — OFFICE VISIT (OUTPATIENT)
Dept: INTERNAL MEDICINE CLINIC | Facility: CLINIC | Age: 73
End: 2018-11-30
Payer: MEDICARE

## 2018-11-30 VITALS
TEMPERATURE: 98 F | WEIGHT: 204 LBS | BODY MASS INDEX: 25.36 KG/M2 | DIASTOLIC BLOOD PRESSURE: 54 MMHG | SYSTOLIC BLOOD PRESSURE: 104 MMHG | HEIGHT: 75 IN | RESPIRATION RATE: 14 BRPM | HEART RATE: 64 BPM

## 2018-11-30 DIAGNOSIS — E78.5 DYSLIPIDEMIA: ICD-10-CM

## 2018-11-30 DIAGNOSIS — I10 ESSENTIAL HYPERTENSION: ICD-10-CM

## 2018-11-30 DIAGNOSIS — I48.20 CHRONIC ATRIAL FIBRILLATION (HCC): ICD-10-CM

## 2018-11-30 DIAGNOSIS — E11.40 TYPE 2 DIABETES MELLITUS WITH DIABETIC NEUROPATHY, WITHOUT LONG-TERM CURRENT USE OF INSULIN (HCC): Primary | ICD-10-CM

## 2018-11-30 DIAGNOSIS — N52.9 ERECTILE DYSFUNCTION, UNSPECIFIED ERECTILE DYSFUNCTION TYPE: ICD-10-CM

## 2018-11-30 DIAGNOSIS — F17.290 CIGAR SMOKER: ICD-10-CM

## 2018-11-30 PROCEDURE — 99214 OFFICE O/P EST MOD 30 MIN: CPT | Performed by: INTERNAL MEDICINE

## 2018-11-30 PROCEDURE — 83036 HEMOGLOBIN GLYCOSYLATED A1C: CPT | Performed by: INTERNAL MEDICINE

## 2018-11-30 RX ORDER — SILDENAFIL 50 MG/1
50 TABLET, FILM COATED ORAL
Qty: 10 TABLET | Refills: 3 | Status: SHIPPED | OUTPATIENT
Start: 2018-11-30 | End: 2019-10-25

## 2018-11-30 NOTE — PROGRESS NOTES
Billy Cough is a 75year old male. Patient presents with: Follow - Up: routine 3 month f/u. LB-rm 3      HPI:     Patient here for f/u -   DM2 - not checking BG regularly,  compliant with medications, cut out the diet soda, hgba1c improved to 7.3.  He h Rfl: 0   CARTIA  MG Oral Capsule SR 24 Hr TAKE 1 CAPSULE (180 MG TOTAL) BY MOUTH ONCE DAILY.  Disp: 90 capsule Rfl: 3   Warfarin Sodium 1 MG Oral Tab 10mg x 5 days and 9mg x 2 days Disp: 32 tablet Rfl: 5   Clobetasol Propionate (TEMOVATE) 0.05 % Apply denies abdominal pain  : no dysuria  NEURO: denies headaches  PSYCH: No reported depression or anxiety  RHEUM: No reported joint swelling  HEME: No adenopathy      EXAM:   /54 (BP Location: Right arm, Patient Position: Sitting, Cuff Size: adult) Consults:  None    Return in about 3 months (around 3/5/2019). There are no Patient Instructions on file for this visit. The patient indicates understanding of these issues and agrees to the plan.

## 2018-12-04 DIAGNOSIS — E11.9 TYPE 2 DIABETES MELLITUS WITHOUT COMPLICATION, WITHOUT LONG-TERM CURRENT USE OF INSULIN (HCC): ICD-10-CM

## 2018-12-04 RX ORDER — CANAGLIFLOZIN 300 MG/1
1 TABLET, FILM COATED ORAL
Qty: 90 TABLET | Refills: 1 | Status: SHIPPED | OUTPATIENT
Start: 2018-12-04 | End: 2019-06-11

## 2018-12-04 RX ORDER — ENALAPRIL MALEATE 2.5 MG/1
2.5 TABLET ORAL DAILY
Qty: 90 TABLET | Refills: 1 | Status: SHIPPED | OUTPATIENT
Start: 2018-12-04 | End: 2019-06-11

## 2018-12-04 RX ORDER — WARFARIN SODIUM 1 MG/1
TABLET ORAL
Qty: 32 TABLET | Refills: 5 | Status: SHIPPED | OUTPATIENT
Start: 2018-12-04 | End: 2019-05-31

## 2018-12-04 NOTE — TELEPHONE ENCOUNTER
Last Office Visit: 11-30-18 with TB for diabetes  Last Rx Filled: 11-15-18 120 tabs with no refills   Last Labs: 11-30-18 hga1c. 7-10-18 pt/inr/cbc/bmp/hga1c  Future Appointment: none    Per protocol to provider

## 2019-01-14 ENCOUNTER — TELEPHONE (OUTPATIENT)
Dept: INTERNAL MEDICINE CLINIC | Facility: CLINIC | Age: 74
End: 2019-01-14

## 2019-01-14 ENCOUNTER — APPOINTMENT (OUTPATIENT)
Dept: LAB | Age: 74
End: 2019-01-14
Attending: INTERNAL MEDICINE
Payer: MEDICARE

## 2019-01-14 DIAGNOSIS — Z79.01 LONG TERM (CURRENT) USE OF ANTICOAGULANTS: Primary | ICD-10-CM

## 2019-01-14 DIAGNOSIS — Z79.01 LONG TERM (CURRENT) USE OF ANTICOAGULANTS: ICD-10-CM

## 2019-01-14 LAB
INR BLD: 2.33 (ref 0.9–1.1)
PSA SERPL DL<=0.01 NG/ML-MCNC: 26.3 SECONDS (ref 12.4–14.7)

## 2019-01-14 PROCEDURE — 85610 PROTHROMBIN TIME: CPT

## 2019-01-14 NOTE — TELEPHONE ENCOUNTER
Pt is in the waiting room and would like to get labs done for his PT INR needs a standing order placed.  Please add ASAP

## 2019-01-14 NOTE — TELEPHONE ENCOUNTER
Per result note on 11/5/18- pt to recheck INR in 1 month. Not completed. Pt in waiting room, no order in system. INR order placed per written protocol. TB- ok to place standing order for INR? Pended for your approval if appropriate.   Please advise, t

## 2019-01-25 DIAGNOSIS — I48.20 CHRONIC ATRIAL FIBRILLATION (HCC): ICD-10-CM

## 2019-01-25 RX ORDER — WARFARIN SODIUM 5 MG/1
TABLET ORAL
Qty: 120 TABLET | Refills: 0 | Status: SHIPPED | OUTPATIENT
Start: 2019-01-25 | End: 2019-03-31

## 2019-01-25 RX ORDER — GLIPIZIDE 10 MG/1
TABLET, FILM COATED, EXTENDED RELEASE ORAL
Qty: 180 TABLET | Refills: 0 | Status: SHIPPED | OUTPATIENT
Start: 2019-01-25 | End: 2019-05-05

## 2019-01-25 NOTE — TELEPHONE ENCOUNTER
Last Office Visit: 11-30-18 with TB for follow up  Last Rx Filled:   Glipizide 11-5-18 180 tabs with no refills  Warfarin 11-15-18 120 tabs with no refills  Last Labs: 1-14-19 pt/inr. 11-30-18 hga1c  Future Appointment: none    Per protocol to provider

## 2019-03-04 RX ORDER — ROSUVASTATIN CALCIUM 20 MG/1
TABLET, COATED ORAL
Qty: 90 TABLET | Refills: 0 | Status: SHIPPED | OUTPATIENT
Start: 2019-03-04 | End: 2019-05-06

## 2019-03-12 ENCOUNTER — TELEPHONE (OUTPATIENT)
Dept: INTERNAL MEDICINE CLINIC | Facility: CLINIC | Age: 74
End: 2019-03-12

## 2019-03-12 NOTE — TELEPHONE ENCOUNTER
Medicare Wellness   Future Appointments   Date Time Provider Marilyn Monika   5/6/2019  2:15 PM Shama Jackson MD [facility]     Orders to [id number] Moore aware must fast no call back required

## 2019-03-13 DIAGNOSIS — E11.9 TYPE 2 DIABETES MELLITUS WITHOUT COMPLICATION, WITHOUT LONG-TERM CURRENT USE OF INSULIN (HCC): ICD-10-CM

## 2019-03-25 ENCOUNTER — TELEPHONE (OUTPATIENT)
Dept: INTERNAL MEDICINE CLINIC | Facility: CLINIC | Age: 74
End: 2019-03-25

## 2019-03-25 NOTE — TELEPHONE ENCOUNTER
Dm eye exam received dated 3/25/19 completed by Dhruv Pak at [facility] no dm retinopathy noted in repor abstracted and placed in TB's bin to be reviewed

## 2019-03-31 DIAGNOSIS — I48.20 CHRONIC ATRIAL FIBRILLATION (HCC): ICD-10-CM

## 2019-04-01 RX ORDER — WARFARIN SODIUM 5 MG/1
TABLET ORAL
Qty: 120 TABLET | Refills: 0 | Status: SHIPPED | OUTPATIENT
Start: 2019-04-01 | End: 2019-06-15

## 2019-04-01 NOTE — TELEPHONE ENCOUNTER
Last Office Visit: 11-30-18 with TB for follow up   Last Rx Filled: 1-25-19 120 tabs with no refills   Last Labs: 1-14-19 pt/inr. 11-30-18 hga1c  Future Appointment: 5-6-19    Per protocol to provider

## 2019-04-03 ENCOUNTER — MED REC SCAN ONLY (OUTPATIENT)
Dept: INTERNAL MEDICINE CLINIC | Facility: CLINIC | Age: 74
End: 2019-04-03

## 2019-04-05 ENCOUNTER — LAB ENCOUNTER (OUTPATIENT)
Dept: LAB | Age: 74
End: 2019-04-05
Attending: INTERNAL MEDICINE
Payer: MEDICARE

## 2019-04-05 DIAGNOSIS — E78.5 DYSLIPIDEMIA: ICD-10-CM

## 2019-04-05 DIAGNOSIS — E11.40 TYPE 2 DIABETES MELLITUS WITH DIABETIC NEUROPATHY, WITHOUT LONG-TERM CURRENT USE OF INSULIN (HCC): ICD-10-CM

## 2019-04-05 DIAGNOSIS — I10 ESSENTIAL HYPERTENSION: ICD-10-CM

## 2019-04-05 DIAGNOSIS — I48.20 CHRONIC ATRIAL FIBRILLATION (HCC): ICD-10-CM

## 2019-04-05 DIAGNOSIS — Z79.01 LONG TERM (CURRENT) USE OF ANTICOAGULANTS: ICD-10-CM

## 2019-04-05 DIAGNOSIS — F17.290 CIGAR SMOKER: ICD-10-CM

## 2019-04-05 PROCEDURE — 80061 LIPID PANEL: CPT

## 2019-04-05 PROCEDURE — 80053 COMPREHEN METABOLIC PANEL: CPT

## 2019-04-05 PROCEDURE — 82570 ASSAY OF URINE CREATININE: CPT

## 2019-04-05 PROCEDURE — 85025 COMPLETE CBC W/AUTO DIFF WBC: CPT

## 2019-04-05 PROCEDURE — 83036 HEMOGLOBIN GLYCOSYLATED A1C: CPT

## 2019-04-05 PROCEDURE — 85610 PROTHROMBIN TIME: CPT

## 2019-04-05 PROCEDURE — 82043 UR ALBUMIN QUANTITATIVE: CPT

## 2019-04-05 PROCEDURE — 84443 ASSAY THYROID STIM HORMONE: CPT

## 2019-04-05 NOTE — TELEPHONE ENCOUNTER
Pt is here getting other labs done he is going to be out of town today is the only day he can do the labs, Please enter any additional If any. No call back necessary to pt.

## 2019-05-06 ENCOUNTER — OFFICE VISIT (OUTPATIENT)
Dept: INTERNAL MEDICINE CLINIC | Facility: CLINIC | Age: 74
End: 2019-05-06
Payer: MEDICARE

## 2019-05-06 VITALS
HEART RATE: 80 BPM | HEIGHT: 75 IN | TEMPERATURE: 98 F | SYSTOLIC BLOOD PRESSURE: 100 MMHG | WEIGHT: 209 LBS | DIASTOLIC BLOOD PRESSURE: 56 MMHG | BODY MASS INDEX: 25.99 KG/M2

## 2019-05-06 DIAGNOSIS — Z12.5 ENCOUNTER FOR SPECIAL SCREENING EXAMINATION FOR NEOPLASM OF PROSTATE: ICD-10-CM

## 2019-05-06 DIAGNOSIS — Z00.00 ENCOUNTER FOR ANNUAL HEALTH EXAMINATION: Primary | ICD-10-CM

## 2019-05-06 DIAGNOSIS — I48.20 CHRONIC ATRIAL FIBRILLATION (HCC): ICD-10-CM

## 2019-05-06 DIAGNOSIS — E11.40 TYPE 2 DIABETES MELLITUS WITH DIABETIC NEUROPATHY, WITHOUT LONG-TERM CURRENT USE OF INSULIN (HCC): ICD-10-CM

## 2019-05-06 DIAGNOSIS — E78.5 DYSLIPIDEMIA: ICD-10-CM

## 2019-05-06 PROCEDURE — G0439 PPPS, SUBSEQ VISIT: HCPCS | Performed by: INTERNAL MEDICINE

## 2019-05-06 RX ORDER — GLIPIZIDE 10 MG/1
TABLET, FILM COATED, EXTENDED RELEASE ORAL
Qty: 180 TABLET | Refills: 0 | Status: SHIPPED | OUTPATIENT
Start: 2019-05-06 | End: 2019-07-22

## 2019-05-06 RX ORDER — ROSUVASTATIN CALCIUM 20 MG/1
TABLET, COATED ORAL
Qty: 90 TABLET | Refills: 1 | Status: SHIPPED | OUTPATIENT
Start: 2019-05-06 | End: 2019-12-02

## 2019-05-06 NOTE — PROGRESS NOTES
HPI:   Geno Magallanes is a 75year old male who presents for a Medicare Subsequent Annual Wellness visit (Pt already had Initial Annual Wellness).     Patient with chronic a fib on coumadin, DM2, cigar smoker here for wellness  A fib , chronic - sees Dr. Yari Harris (Internal Medicine)  Amos Palm MD as PCP - MSSP  Elmo Albright DPM ([facility])  Adrianne Gusman MD (Cardiovascular Diseases)  Melissa Marroquin MD (DERMATOLOGY)    Patient Active Problem List:     Nontoxic multinodular goiter     Pure hyperchol Sildenafil Citrate 50 MG Oral Tab Take 1 tablet (50 mg total) by mouth daily as needed for Erectile Dysfunction.    METOPROLOL SUCCINATE  MG Oral Tablet 24 Hr TAKE 1/2 TABLET BY MOUTH DAILY   CARTIA  MG Oral Capsule SR 24 Hr TAKE 1 CAPSULE (18 pain or ST  LUNGS: denies shortness of breath with exertion  CARDIOVASCULAR: denies chest pain on exertion  GI: denies abdominal pain, denies heartburn  : 1 per night nocturia, no complaint of urinary incontinence  MUSCULOSKELETAL: denies back pain  NEUR Skin: Skin color, texture, turgor normal, no rashes or lesions   Lymph nodes: Cervical, supraclavicular, and axillary nodes normal   Neurologic: alert     Bilateral barefoot skin diabetic exam is normal, visualized feet and the appearance is normal.  Cliff prostate  -     PSA SCREEN; Future    Dyslipidemia- controlled, CPM  -     Rosuvastatin Calcium 20 MG Oral Tab; TAKE ONE TABLET BY MOUTH ONCE DAILY         Diet assessment: good     PLAN:  The patient indicates understanding of these issues and agrees to t found.    Glaucoma Screening      Ophthalmology Visit Annually: Diabetics, FHx Glaucoma, AA>50, > 65 Data entered on: 3/25/2019   Last Dilated Eye Exam 3/25/2019       Prostate Cancer Screening      PSA  Annually PSA due on 04/25/2020  Update Maria G A1C (%)   Date Value   11/30/2018 7.3 (A)     HgbA1C (%)   Date Value   04/05/2019 7.7 (H)       No flowsheet data found.     Creat/alb ratio  Annually Malb/Cre Calc (ug/mg)   Date Value   04/05/2019 8.0     MALB/CRE CALC (ug/mg)   Date Value   03/15/2011 8

## 2019-05-06 NOTE — PATIENT INSTRUCTIONS
Lanelle Clause SCREENING SCHEDULE   Tests on this list are recommended by your physician but may not be covered, or covered at this frequency, by your insurer. Please check with your insurance carrier before scheduling to verify coverage.     PREVENTATIV this or any previous visit.  Limited to patients who meet one of the following criteria:   • Men who are 73-68 years old and have smoked more than 100 cigarettes in their lifetime   • Anyone with a family history    Colorectal Cancer Screening Covered up to after 65 No orders found for this or any previous visit. Please get once after your 65th birthday    Hepatitis B for Moderate/High Risk No orders found for this or any previous visit.  Medium/high risk factors:   End-stage renal disease   Hemophiliacs who r

## 2019-05-31 RX ORDER — WARFARIN SODIUM 1 MG/1
TABLET ORAL
Qty: 32 TABLET | Refills: 5 | Status: SHIPPED | OUTPATIENT
Start: 2019-05-31 | End: 2019-12-20

## 2019-05-31 NOTE — TELEPHONE ENCOUNTER
Last Ov: 5/6/19, TB, physical  Upcoming appt: no upcoming scheduled appt  Last labs: PT, CBC, TSH w Ref T4, Mircoalb/creat, CMP, Lipid, A1c 4/5/19  Last Rx: warfarin 1mg, #32, 5 R, 12/4/18     Per Protocol, not on protocol.  Rx pending to pharmacy, please s

## 2019-06-11 DIAGNOSIS — E11.9 TYPE 2 DIABETES MELLITUS WITHOUT COMPLICATION, WITHOUT LONG-TERM CURRENT USE OF INSULIN (HCC): ICD-10-CM

## 2019-06-11 RX ORDER — CANAGLIFLOZIN 300 MG/1
1 TABLET, FILM COATED ORAL
Qty: 90 TABLET | Refills: 0 | Status: SHIPPED | OUTPATIENT
Start: 2019-06-11 | End: 2019-07-18

## 2019-06-11 RX ORDER — ENALAPRIL MALEATE 2.5 MG/1
2.5 TABLET ORAL DAILY
Qty: 90 TABLET | Refills: 0 | Status: SHIPPED | OUTPATIENT
Start: 2019-06-11 | End: 2019-07-18

## 2019-06-11 NOTE — TELEPHONE ENCOUNTER
Last Ov: 5/6/19, TB, physical  Upcoming appt: no upcoming scheduled appt  Last labs: PT, CBC, TSH W Ref T4, Microalb/creat, CMP, Lipid, A1c 4/5/19  Last Rx:   Enalapril 2.5mg, #90, 1 R, 12/4/18  Invokana 300mg, #90, 1 R 12/4/18    Per Protocol, invokana fa

## 2019-06-15 DIAGNOSIS — I48.20 CHRONIC ATRIAL FIBRILLATION (HCC): ICD-10-CM

## 2019-06-15 NOTE — TELEPHONE ENCOUNTER
Last Ov: 5/6/19, TB, physical  Upcoming appt: no upcoming appt  Last labs: PT, CBC, TSH w Ref T4, Microalb/creat, CMP, Lipid, A1c 4/5/19  Last Rx: warfarin 5mg, #120, 0 R 4/1/19    Per Protocol, not on protocol. Rx pending, please sign if appropriate.

## 2019-06-16 RX ORDER — WARFARIN SODIUM 5 MG/1
TABLET ORAL
Qty: 120 TABLET | Refills: 0 | Status: SHIPPED | OUTPATIENT
Start: 2019-06-16 | End: 2019-08-26

## 2019-07-16 ENCOUNTER — APPOINTMENT (OUTPATIENT)
Dept: LAB | Age: 74
End: 2019-07-16
Attending: INTERNAL MEDICINE
Payer: MEDICARE

## 2019-07-16 DIAGNOSIS — Z12.5 ENCOUNTER FOR SPECIAL SCREENING EXAMINATION FOR NEOPLASM OF PROSTATE: ICD-10-CM

## 2019-07-16 DIAGNOSIS — E11.40 TYPE 2 DIABETES MELLITUS WITH DIABETIC NEUROPATHY, WITHOUT LONG-TERM CURRENT USE OF INSULIN (HCC): ICD-10-CM

## 2019-07-16 DIAGNOSIS — Z79.01 LONG TERM (CURRENT) USE OF ANTICOAGULANTS: ICD-10-CM

## 2019-07-16 LAB
COMPLEXED PSA SERPL-MCNC: 2.54 NG/ML (ref ?–4)
EST. AVERAGE GLUCOSE BLD GHB EST-MCNC: 174 MG/DL (ref 68–126)
HBA1C MFR BLD HPLC: 7.7 % (ref ?–5.7)
INR BLD: 2.05 (ref 0.9–1.1)
PSA SERPL DL<=0.01 NG/ML-MCNC: 24.4 SECONDS (ref 12.5–14.7)

## 2019-07-16 PROCEDURE — 83036 HEMOGLOBIN GLYCOSYLATED A1C: CPT

## 2019-07-16 PROCEDURE — 85610 PROTHROMBIN TIME: CPT

## 2019-07-22 RX ORDER — GLIPIZIDE 10 MG/1
TABLET, FILM COATED, EXTENDED RELEASE ORAL
Qty: 180 TABLET | Refills: 0 | Status: SHIPPED | OUTPATIENT
Start: 2019-07-22 | End: 2019-10-28

## 2019-07-22 NOTE — TELEPHONE ENCOUNTER
Protocol failed due to Last HgBA1C < 7.5    Please advise,    LOV:5/6/19 TB  FOV:none on file   LAST RX: 5/6/19 10 mg take 2 tabs in the morning 180 tabs 0 refills   LAST LABS:7/16/19 pt,a1c,psa  PER PROTOCOL: to provider

## 2019-07-22 NOTE — TELEPHONE ENCOUNTER
Future Appointments   Date Time Provider aMrilyn Frank          8/20/2019 10:20 AM Coral Caldwell MD [facility]

## 2019-08-20 ENCOUNTER — OFFICE VISIT (OUTPATIENT)
Dept: INTERNAL MEDICINE CLINIC | Facility: CLINIC | Age: 74
End: 2019-08-20
Payer: MEDICARE

## 2019-08-20 VITALS
HEART RATE: 68 BPM | SYSTOLIC BLOOD PRESSURE: 110 MMHG | RESPIRATION RATE: 14 BRPM | BODY MASS INDEX: 25.16 KG/M2 | WEIGHT: 202.38 LBS | DIASTOLIC BLOOD PRESSURE: 72 MMHG | HEIGHT: 75 IN

## 2019-08-20 DIAGNOSIS — E11.40 TYPE 2 DIABETES MELLITUS WITH DIABETIC NEUROPATHY, WITHOUT LONG-TERM CURRENT USE OF INSULIN (HCC): Primary | ICD-10-CM

## 2019-08-20 DIAGNOSIS — I10 ESSENTIAL HYPERTENSION: ICD-10-CM

## 2019-08-20 DIAGNOSIS — I48.20 CHRONIC ATRIAL FIBRILLATION (HCC): ICD-10-CM

## 2019-08-20 PROCEDURE — 99213 OFFICE O/P EST LOW 20 MIN: CPT | Performed by: INTERNAL MEDICINE

## 2019-08-20 NOTE — PROGRESS NOTES
Maryjo Ren is a 75year old male. Patient presents with: Follow - Up: cn room 4: 3 month follow up       HPI:     Patient here for f/u-  Chronic a fib- inr in range, no problems reported with bleeding, has stress test yesterday and WNL.  BP controlled Propionate (TEMOVATE) 0.05 % Apply Externally Ointment Apply 1 Application topically as needed. Disp:  Rfl: 0   Omega-3 Fatty Acids (FISH OIL CONCENTRATE OR) Take 1 tablet by mouth daily.  Disp:  Rfl:    Cholecalciferol (VITAMIN D) 1000 UNITS Oral Tab Take 6.4 oz   BMI 25.30 kg/m²   GENERAL: well developed, NAD  NECK: supple,no adenopathy  LUNGS: normal rate without respiratory distress, lungs clear to auscultation  CARDIO: nl S1 S2  GI: normal bowel sounds, soft, NT/ND  EXTREMITIES: no C/C/E, no open sores

## 2019-08-26 DIAGNOSIS — I48.20 CHRONIC ATRIAL FIBRILLATION (HCC): ICD-10-CM

## 2019-08-26 RX ORDER — WARFARIN SODIUM 5 MG/1
TABLET ORAL
Qty: 120 TABLET | Refills: 0 | Status: SHIPPED | OUTPATIENT
Start: 2019-08-26 | End: 2019-10-28

## 2019-08-26 NOTE — TELEPHONE ENCOUNTER
LOV:8/20/19 TB  FOV:none on file   LAST RX:6/16/19 5 mg take 2 tabs 5 times a week and 9 mg 2 days a week 120 tabs 0 refills   LAST LABS:7/16/19 pt,a1c,psa  PER PROTOCOL: to provider

## 2019-10-15 ENCOUNTER — APPOINTMENT (OUTPATIENT)
Dept: LAB | Age: 74
End: 2019-10-15
Attending: INTERNAL MEDICINE
Payer: MEDICARE

## 2019-10-15 DIAGNOSIS — Z79.01 LONG TERM (CURRENT) USE OF ANTICOAGULANTS: ICD-10-CM

## 2019-10-15 DIAGNOSIS — I10 ESSENTIAL HYPERTENSION: ICD-10-CM

## 2019-10-15 DIAGNOSIS — E11.40 TYPE 2 DIABETES MELLITUS WITH DIABETIC NEUROPATHY, WITHOUT LONG-TERM CURRENT USE OF INSULIN (HCC): ICD-10-CM

## 2019-10-15 PROCEDURE — 83036 HEMOGLOBIN GLYCOSYLATED A1C: CPT

## 2019-10-15 PROCEDURE — 80048 BASIC METABOLIC PNL TOTAL CA: CPT

## 2019-10-15 PROCEDURE — 85610 PROTHROMBIN TIME: CPT

## 2019-10-24 ENCOUNTER — APPOINTMENT (OUTPATIENT)
Dept: LAB | Age: 74
End: 2019-10-24
Attending: INTERNAL MEDICINE
Payer: MEDICARE

## 2019-10-24 DIAGNOSIS — Z79.01 LONG TERM (CURRENT) USE OF ANTICOAGULANTS: ICD-10-CM

## 2019-10-24 PROCEDURE — 85610 PROTHROMBIN TIME: CPT

## 2019-10-25 DIAGNOSIS — N52.9 ERECTILE DYSFUNCTION, UNSPECIFIED ERECTILE DYSFUNCTION TYPE: ICD-10-CM

## 2019-10-25 NOTE — TELEPHONE ENCOUNTER
Last Ov: 8/20/19, TB, F/U  Upcoming appt: 11/5/19, TB  Last labs: PT, A2c, BMP 10/15/19  Last Rx: sildenafil 50mg, #10, 3R 11/30/18    Per Protocol, not on protocol. Rx pending.

## 2019-10-27 RX ORDER — SILDENAFIL 50 MG/1
50 TABLET, FILM COATED ORAL
Qty: 10 TABLET | Refills: 3 | Status: SHIPPED | OUTPATIENT
Start: 2019-10-27 | End: 2020-10-14

## 2019-10-28 DIAGNOSIS — I48.20 CHRONIC ATRIAL FIBRILLATION (HCC): ICD-10-CM

## 2019-10-28 NOTE — TELEPHONE ENCOUNTER
Protocol for glipizide failed due to Last HgBA1C < 7.5    Please advise,    LOV:8/20/19 TB  FOV:11/5/19  LAST RX:  Glipizide:7/22/19 10 mg take 2 tabs daily 180 tabs 0 refills     Warfarin: 8/26/19 5 mg take 2 tabs 5 times a week and 9 mg twice a week 120

## 2019-10-29 RX ORDER — GLIPIZIDE 10 MG/1
TABLET, FILM COATED, EXTENDED RELEASE ORAL
Qty: 180 TABLET | Refills: 1 | Status: SHIPPED | OUTPATIENT
Start: 2019-10-29 | End: 2020-04-29

## 2019-10-29 RX ORDER — WARFARIN SODIUM 5 MG/1
TABLET ORAL
Qty: 120 TABLET | Refills: 1 | Status: SHIPPED | OUTPATIENT
Start: 2019-10-29 | End: 2020-03-09

## 2019-11-05 ENCOUNTER — OFFICE VISIT (OUTPATIENT)
Dept: INTERNAL MEDICINE CLINIC | Facility: CLINIC | Age: 74
End: 2019-11-05
Payer: MEDICARE

## 2019-11-05 ENCOUNTER — APPOINTMENT (OUTPATIENT)
Dept: LAB | Age: 74
End: 2019-11-05
Attending: INTERNAL MEDICINE
Payer: MEDICARE

## 2019-11-05 VITALS
SYSTOLIC BLOOD PRESSURE: 92 MMHG | BODY MASS INDEX: 25.12 KG/M2 | DIASTOLIC BLOOD PRESSURE: 60 MMHG | WEIGHT: 202 LBS | HEART RATE: 84 BPM | TEMPERATURE: 97 F | RESPIRATION RATE: 16 BRPM | HEIGHT: 75 IN

## 2019-11-05 DIAGNOSIS — Z79.01 LONG TERM (CURRENT) USE OF ANTICOAGULANTS: ICD-10-CM

## 2019-11-05 DIAGNOSIS — I48.20 CHRONIC ATRIAL FIBRILLATION (HCC): ICD-10-CM

## 2019-11-05 DIAGNOSIS — I10 ESSENTIAL HYPERTENSION: ICD-10-CM

## 2019-11-05 DIAGNOSIS — E11.40 TYPE 2 DIABETES MELLITUS WITH DIABETIC NEUROPATHY, WITHOUT LONG-TERM CURRENT USE OF INSULIN (HCC): Primary | ICD-10-CM

## 2019-11-05 PROCEDURE — 85610 PROTHROMBIN TIME: CPT

## 2019-11-05 PROCEDURE — 99214 OFFICE O/P EST MOD 30 MIN: CPT | Performed by: INTERNAL MEDICINE

## 2019-11-05 NOTE — PROGRESS NOTES
Geno Magallanes is a 75year old male. Patient presents with:  Diabetes: SN Rm 3, F/U  Follow - Up: a fib and htn, inr low last time      HPI:     Patient here for f/u-  DM2- compliant with medications, on  janumet , invokana, and glipizide ER max doses.  No WARFARIN SODIUM 1 MG Oral Tab TAKE 10MG 5 DAYS AND 9MG 2 DAYS A WEEK 32 tablet 5   • SITagliptin-metFORMIN HCl (JANUMET)  MG Oral Tab TAKE 1 TABLET BY MOUTH 2 (TWO) TIMES DAILY WITH MEALS. 180 tablet 1   • Rosuvastatin Calcium 20 MG Oral Tab TAKE ON GENERAL HEALTH:  no fevers or chills  SKIN: denies any unusual skin lesions or rashes  RESPIRATORY: no cough  CARDIOVASCULAR: denies chest pain  GI: denies abdominal pain       EXAM:   BP 92/60 (BP Location: Right arm, Patient Position: Sitting, Cuff Siz

## 2019-12-02 DIAGNOSIS — E78.5 DYSLIPIDEMIA: ICD-10-CM

## 2019-12-02 RX ORDER — ROSUVASTATIN CALCIUM 20 MG/1
TABLET, COATED ORAL
Qty: 90 TABLET | Refills: 1 | Status: SHIPPED | OUTPATIENT
Start: 2019-12-02 | End: 2020-05-29

## 2019-12-18 ENCOUNTER — APPOINTMENT (OUTPATIENT)
Dept: LAB | Age: 74
End: 2019-12-18
Attending: INTERNAL MEDICINE
Payer: MEDICARE

## 2019-12-18 DIAGNOSIS — Z79.01 LONG TERM (CURRENT) USE OF ANTICOAGULANTS: ICD-10-CM

## 2019-12-18 PROCEDURE — 85610 PROTHROMBIN TIME: CPT

## 2019-12-20 RX ORDER — WARFARIN SODIUM 1 MG/1
TABLET ORAL
Qty: 32 TABLET | Refills: 5 | Status: SHIPPED | OUTPATIENT
Start: 2019-12-20 | End: 2020-11-11

## 2019-12-20 NOTE — TELEPHONE ENCOUNTER
Last Ov: 11/5/19, TB, DM f/u  Upcoming appt: no upcoming appt  Last labs: PT, A1c, BMP 10/15/19  Last Rx: warfarin 1mg, #32 5R 5/31/19    Per Protocol, not on protocol.  Rx pending

## 2020-03-06 ENCOUNTER — APPOINTMENT (OUTPATIENT)
Dept: LAB | Age: 75
End: 2020-03-06
Attending: INTERNAL MEDICINE
Payer: MEDICARE

## 2020-03-06 ENCOUNTER — TELEPHONE (OUTPATIENT)
Dept: INTERNAL MEDICINE CLINIC | Facility: CLINIC | Age: 75
End: 2020-03-06

## 2020-03-06 DIAGNOSIS — I48.20 CHRONIC ATRIAL FIBRILLATION (HCC): Primary | ICD-10-CM

## 2020-03-06 DIAGNOSIS — I48.20 CHRONIC ATRIAL FIBRILLATION (HCC): ICD-10-CM

## 2020-03-06 LAB
INR BLD: 1.9 (ref 0.9–1.1)
PSA SERPL DL<=0.01 NG/ML-MCNC: 22.9 SECONDS (ref 12.5–14.7)

## 2020-03-06 PROCEDURE — 85610 PROTHROMBIN TIME: CPT

## 2020-03-09 DIAGNOSIS — I48.20 CHRONIC ATRIAL FIBRILLATION (HCC): ICD-10-CM

## 2020-03-09 RX ORDER — WARFARIN SODIUM 5 MG/1
TABLET ORAL
Qty: 120 TABLET | Refills: 1 | Status: SHIPPED | OUTPATIENT
Start: 2020-03-09 | End: 2020-07-20

## 2020-03-09 NOTE — TELEPHONE ENCOUNTER
Last Ov: 11/5/19, TB, DM f/u  Upcoming appt: 5/4/20, TB  Last labs: PT, A1c, BMP 10/15/19  Last Rx: warfarin 5mg, #120, 1R 10/29/19    Per Protocol, not on protocol. Rx pending.

## 2020-03-11 ENCOUNTER — TELEPHONE (OUTPATIENT)
Dept: INTERNAL MEDICINE CLINIC | Facility: CLINIC | Age: 75
End: 2020-03-11

## 2020-03-11 NOTE — TELEPHONE ENCOUNTER
Fax received from [phone] [facility] with path consultation reports placed in TB's Bin to be reviewed

## 2020-04-29 DIAGNOSIS — E11.40 TYPE 2 DIABETES MELLITUS WITH DIABETIC NEUROPATHY, WITHOUT LONG-TERM CURRENT USE OF INSULIN (HCC): ICD-10-CM

## 2020-04-29 RX ORDER — GLIPIZIDE 10 MG/1
TABLET, FILM COATED, EXTENDED RELEASE ORAL
Qty: 180 TABLET | Refills: 0 | Status: SHIPPED | OUTPATIENT
Start: 2020-04-29 | End: 2020-07-20

## 2020-04-29 NOTE — TELEPHONE ENCOUNTER
Last Ov: 11/5/19, TB, DM f/u  Upcoming appt: 5/29/20, TB  Last labs: PT, A1c, BMP 10/15/19  Last Rx:   Glipizide ER 10mg, #180, 1R 10/29/19  Janumet 50/1000mg, #180, 1R 5/6/19    Per Protocol, both fail. Pt due for A1c and last A1c out of range.  Rx pending

## 2020-04-30 NOTE — TELEPHONE ENCOUNTER
Spoke with patient informed Fasting Labs ordered prior to appointment 5/29, Patient verbalized understanding.

## 2020-05-21 ENCOUNTER — APPOINTMENT (OUTPATIENT)
Dept: LAB | Facility: HOSPITAL | Age: 75
End: 2020-05-21
Attending: INTERNAL MEDICINE
Payer: MEDICARE

## 2020-05-21 DIAGNOSIS — E11.40 TYPE 2 DIABETES MELLITUS WITH DIABETIC NEUROPATHY, WITHOUT LONG-TERM CURRENT USE OF INSULIN (HCC): ICD-10-CM

## 2020-05-21 DIAGNOSIS — I48.20 CHRONIC ATRIAL FIBRILLATION (HCC): ICD-10-CM

## 2020-05-21 PROCEDURE — 82043 UR ALBUMIN QUANTITATIVE: CPT

## 2020-05-21 PROCEDURE — 85610 PROTHROMBIN TIME: CPT

## 2020-05-21 PROCEDURE — 82570 ASSAY OF URINE CREATININE: CPT

## 2020-05-21 PROCEDURE — 83036 HEMOGLOBIN GLYCOSYLATED A1C: CPT

## 2020-05-21 PROCEDURE — 80061 LIPID PANEL: CPT

## 2020-05-21 PROCEDURE — 36415 COLL VENOUS BLD VENIPUNCTURE: CPT

## 2020-05-21 PROCEDURE — 80053 COMPREHEN METABOLIC PANEL: CPT

## 2020-05-29 ENCOUNTER — VIRTUAL PHONE E/M (OUTPATIENT)
Dept: INTERNAL MEDICINE CLINIC | Facility: CLINIC | Age: 75
End: 2020-05-29
Payer: MEDICARE

## 2020-05-29 DIAGNOSIS — E11.40 TYPE 2 DIABETES MELLITUS WITH DIABETIC NEUROPATHY, WITHOUT LONG-TERM CURRENT USE OF INSULIN (HCC): ICD-10-CM

## 2020-05-29 DIAGNOSIS — E78.5 DYSLIPIDEMIA: ICD-10-CM

## 2020-05-29 DIAGNOSIS — I48.20 CHRONIC ATRIAL FIBRILLATION (HCC): ICD-10-CM

## 2020-05-29 PROCEDURE — 99442 PHONE E/M BY PHYS 11-20 MIN: CPT | Performed by: INTERNAL MEDICINE

## 2020-05-29 RX ORDER — ROSUVASTATIN CALCIUM 20 MG/1
TABLET, COATED ORAL
Qty: 90 TABLET | Refills: 1 | Status: SHIPPED | OUTPATIENT
Start: 2020-05-29 | End: 2020-11-23

## 2020-05-29 NOTE — PROGRESS NOTES
Due to COVID-19 ACTION PLAN, the patient's office visit was converted to a phone visit. Patient understands and accepts financial responsibility for any deductible, co-insurance and/or co-pays associated with this service.   Time Spent:15 min    Subjective medications, radiology tests and decision making. Appropriate medical decision-making and tests are ordered as detailed in the plan of care above.

## 2020-05-29 NOTE — TELEPHONE ENCOUNTER
Last OV: 11/5/19 diabetic and acute f/u    Future Appointments: Next PE due 5/2020   Date Time Provider Marilyn Frank   5/29/2020 10:20 AM Destini Gaspar MD [facility]   9/8/2020  1:20 PM Devin Gramajo MD [facility]        Latest

## 2020-06-26 ENCOUNTER — TELEPHONE (OUTPATIENT)
Dept: INTERNAL MEDICINE CLINIC | Facility: CLINIC | Age: 75
End: 2020-06-26

## 2020-06-26 NOTE — TELEPHONE ENCOUNTER
Received fax from [facility] with most recent DM eye exam, abstracted into chart, placed in TB in for review.

## 2020-07-19 DIAGNOSIS — E11.40 TYPE 2 DIABETES MELLITUS WITH DIABETIC NEUROPATHY, WITHOUT LONG-TERM CURRENT USE OF INSULIN (HCC): ICD-10-CM

## 2020-07-19 DIAGNOSIS — I48.20 CHRONIC ATRIAL FIBRILLATION (HCC): ICD-10-CM

## 2020-07-20 DIAGNOSIS — E11.40 TYPE 2 DIABETES MELLITUS WITH DIABETIC NEUROPATHY, WITHOUT LONG-TERM CURRENT USE OF INSULIN (HCC): ICD-10-CM

## 2020-07-20 RX ORDER — GLIPIZIDE 10 MG/1
TABLET, FILM COATED, EXTENDED RELEASE ORAL
Qty: 180 TABLET | Refills: 0 | Status: SHIPPED | OUTPATIENT
Start: 2020-07-20 | End: 2020-10-27

## 2020-07-20 RX ORDER — WARFARIN SODIUM 5 MG/1
TABLET ORAL
Qty: 120 TABLET | Refills: 1 | Status: SHIPPED | OUTPATIENT
Start: 2020-07-20 | End: 2020-11-23

## 2020-07-20 NOTE — TELEPHONE ENCOUNTER
Last OV 11.5.19 w/ TB (f/up)   Last PE 5.6.19-Overdue   Last REFILL 4.29.20 Janumet 50-1000mg #180 0R  Last LABS 5.21.20 Lipid, Microalb, CMO, HgA1c, PT     Future Appointments   Date Time Provider \[facility]\""   9/8/2020  1:20 PM Merrick Stinson MD

## 2020-07-20 NOTE — TELEPHONE ENCOUNTER
Last OV: 11/5/19 diabetic f/u    Future Appointments   Date Time Provider Marilyn Encisoi   9/8/2020  1:20 PM Dennise Balbuena MD [facility] DMG [facility]        Latest labs: 5/21/20 Lipid, Microalb CMP, A1c and Prothrombin    Latest RX: glipizide ER 10 mg ta

## 2020-09-04 ENCOUNTER — TELEPHONE (OUTPATIENT)
Dept: INTERNAL MEDICINE CLINIC | Facility: CLINIC | Age: 75
End: 2020-09-04

## 2020-09-04 DIAGNOSIS — E11.9 TYPE 2 DIABETES MELLITUS WITHOUT COMPLICATION, WITHOUT LONG-TERM CURRENT USE OF INSULIN (HCC): ICD-10-CM

## 2020-09-04 DIAGNOSIS — Z00.00 ENCOUNTER FOR ANNUAL HEALTH EXAMINATION: Primary | ICD-10-CM

## 2020-09-04 DIAGNOSIS — E11.40 TYPE 2 DIABETES MELLITUS WITH DIABETIC NEUROPATHY, WITHOUT LONG-TERM CURRENT USE OF INSULIN (HCC): ICD-10-CM

## 2020-09-04 DIAGNOSIS — I10 ESSENTIAL HYPERTENSION: ICD-10-CM

## 2020-09-04 NOTE — TELEPHONE ENCOUNTER
Wellness   Future Appointments   Date Time Provider Marilyn Monika   10/19/2020  1:40 PM Kati Olivares MD [facility]        Orders to [place] aware must fast no call back required

## 2020-09-04 NOTE — TELEPHONE ENCOUNTER
FWD to TB,  Placed order for CBC, TSH to be done. Pt had CMP, A1c, Lipid, PTT done 05/21/20, would you like repeat of these labs as well? Please advise.

## 2020-09-04 NOTE — TELEPHONE ENCOUNTER
Last VISIT 11/05/19    Last REFILL 07/18/19 qty 90 w/3 refills    Last LABS 05/21/20 Lipid, Microalb/creat, CMP, A1c, PTT done    Future Appointments                 10/19/2020  1:40 PM Mechelle Cárdenas MD [facility]           Please Approve or [place]

## 2020-09-04 NOTE — TELEPHONE ENCOUNTER
Prescription Refill Request - Patient advised can take 48-72 hours. Name of Medication (strength, dose, qty requested:   Canagliflozin (INVOKANA) 300 MG Oral Tab 90 tablet 3 7/18/2019     Sig - Route:  Take 1 tablet by mouth once daily. - Oral        Whi

## 2020-10-06 ENCOUNTER — LAB ENCOUNTER (OUTPATIENT)
Dept: LAB | Facility: HOSPITAL | Age: 75
End: 2020-10-06
Attending: DERMATOLOGY
Payer: MEDICARE

## 2020-10-06 DIAGNOSIS — I48.20 CHRONIC ATRIAL FIBRILLATION (HCC): ICD-10-CM

## 2020-10-06 DIAGNOSIS — E11.40 TYPE 2 DIABETES MELLITUS WITH DIABETIC NEUROPATHY, WITHOUT LONG-TERM CURRENT USE OF INSULIN (HCC): ICD-10-CM

## 2020-10-06 DIAGNOSIS — Z00.00 ENCOUNTER FOR ANNUAL HEALTH EXAMINATION: ICD-10-CM

## 2020-10-06 DIAGNOSIS — I10 ESSENTIAL HYPERTENSION: ICD-10-CM

## 2020-10-06 PROCEDURE — 36415 COLL VENOUS BLD VENIPUNCTURE: CPT

## 2020-10-06 PROCEDURE — 85025 COMPLETE CBC W/AUTO DIFF WBC: CPT

## 2020-10-06 PROCEDURE — 84443 ASSAY THYROID STIM HORMONE: CPT

## 2020-10-06 PROCEDURE — 85610 PROTHROMBIN TIME: CPT

## 2020-10-06 PROCEDURE — 80048 BASIC METABOLIC PNL TOTAL CA: CPT

## 2020-10-06 PROCEDURE — 83036 HEMOGLOBIN GLYCOSYLATED A1C: CPT

## 2020-10-14 DIAGNOSIS — N52.9 ERECTILE DYSFUNCTION, UNSPECIFIED ERECTILE DYSFUNCTION TYPE: ICD-10-CM

## 2020-10-14 RX ORDER — SILDENAFIL 50 MG/1
50 TABLET, FILM COATED ORAL
Qty: 10 TABLET | Refills: 3 | Status: SHIPPED | OUTPATIENT
Start: 2020-10-14 | End: 2021-09-29

## 2020-10-14 NOTE — TELEPHONE ENCOUNTER
Last Ov: 5/29/20, TB, virtual (F/U)  Last labs: PT, CBC, TSH w Ref, A1c, BMP 10/6/20  Last Rx: sildenafil 50mg, #10, 3R 10/27/19    Future Appointments   Date Time Provider Marilyn Frank   10/19/2020  1:40 PM Olga Rodriguez MD [facility]   3

## 2020-10-19 ENCOUNTER — OFFICE VISIT (OUTPATIENT)
Dept: INTERNAL MEDICINE CLINIC | Facility: CLINIC | Age: 75
End: 2020-10-19
Payer: MEDICARE

## 2020-10-19 VITALS
WEIGHT: 193 LBS | TEMPERATURE: 97 F | DIASTOLIC BLOOD PRESSURE: 64 MMHG | SYSTOLIC BLOOD PRESSURE: 128 MMHG | HEIGHT: 72.84 IN | HEART RATE: 52 BPM | BODY MASS INDEX: 25.58 KG/M2 | RESPIRATION RATE: 16 BRPM | OXYGEN SATURATION: 99 %

## 2020-10-19 DIAGNOSIS — I10 ESSENTIAL HYPERTENSION: ICD-10-CM

## 2020-10-19 DIAGNOSIS — F17.290 CIGAR SMOKER: ICD-10-CM

## 2020-10-19 DIAGNOSIS — E78.00 PURE HYPERCHOLESTEROLEMIA: ICD-10-CM

## 2020-10-19 DIAGNOSIS — I48.20 CHRONIC ATRIAL FIBRILLATION (HCC): ICD-10-CM

## 2020-10-19 DIAGNOSIS — D69.6 THROMBOCYTOPENIA (HCC): ICD-10-CM

## 2020-10-19 DIAGNOSIS — Z00.00 ENCOUNTER FOR ANNUAL HEALTH EXAMINATION: Primary | ICD-10-CM

## 2020-10-19 DIAGNOSIS — E11.40 TYPE 2 DIABETES MELLITUS WITH DIABETIC NEUROPATHY, WITHOUT LONG-TERM CURRENT USE OF INSULIN (HCC): ICD-10-CM

## 2020-10-19 PROCEDURE — G0439 PPPS, SUBSEQ VISIT: HCPCS | Performed by: INTERNAL MEDICINE

## 2020-10-19 NOTE — PATIENT INSTRUCTIONS
Michael Davidson SCREENING SCHEDULE   Tests on this list are recommended by your physician but may not be covered, or covered at this frequency, by your insurer. Please check with your insurance carrier before scheduling to verify coverage.     PREVENTATIV or any previous visit.  Limited to patients who meet one of the following criteria:   • Men who are 73-68 years old and have smoked more than 100 cigarettes in their lifetime   • Anyone with a family history    Colorectal Cancer Screening Covered up to Age get once after your 65th birthday    Pneumococcal 23 (Pneumovax)  Covered Once after 65 No orders found for this or any previous visit.  Please get once after your 65th birthday    Hepatitis B for Moderate/High Risk No orders found for this or any previous

## 2020-10-19 NOTE — PROGRESS NOTES
HPI:   Magui Owen is a 67year old male who presents for a Medicare Subsequent Annual Wellness visit (Pt already had Initial Annual Wellness). DM2- cut out the sweets and hgba1c down to 7.2, utd on eye exam, mild numbness in feet- come and goes.  No rosalia Tobacco Cessation counseling given as YES and refresh this link)          CAGE Alcohol screening   Helen Bledsoe was screened for Alcohol abuse and had a score of 0 so is at low risk.      Patient Care Team: Patient Care Team:  Manjit Arce MD as [facility] AND WOMEN'S [facility] DAILY    •  CARTIA  MG Oral Capsule SR 24 Hr, TAKE 1 CAPSULE (180 MG TOTAL) BY MOUTH ONCE DAILY.     •  GLIPIZIDE ER 10 MG Oral Tablet 24 Hr, TAKE TWO TABLETS BY MOUTH EVERY MORNING    •  WARFARIN SODIUM 5 MG Oral Tab, TAKE TWO TABLETS (10MG) 5 TIMES grandfather. SOCIAL HISTORY:   He  reports that he has been smoking cigars. He has never used smokeless tobacco. He reports current alcohol use of about 4.0 - 5.0 standard drinks of alcohol per week. He reports that he does not use drugs.      REVIEW OF S normal, no rashes or lesions   Lymph nodes: Cervical, supraclavicular, and axillary nodes normal   Neurologic: Normal     Bilateral barefoot skin diabetic exam is normal, visualized feet and the appearance is normal.  Bilateral monofilament/sensation of nile diabetes mellitus with diabetic neuropathy, without long-term current use of insulin ([facility] [address]- BG controlled on current medications, CPM. He is utd on eye exam, neuropathy in feet very mild- no medications reqired  -     HEMOGLOBIN A1C; Future  -     COMP MET reminders to display for this patient. Update Health Maintenance if applicable    Flex Sigmoidoscopy Screen every 10 years No results found for this or any previous visit. No flowsheet data found.      Fecal Occult Blood Annually No results found for: FOB N flowsheet data found. Creatinine  Annually CREATININE (mg/dL)   Date Value   11/05/2013 0.73     Creatinine (mg/dL)   Date Value   10/06/2020 1.09    No flowsheet data found.     Drug Serum Conc  Annually No results found for: DIGOXIN, DIG, VALP No flows

## 2020-10-27 DIAGNOSIS — E11.40 TYPE 2 DIABETES MELLITUS WITH DIABETIC NEUROPATHY, WITHOUT LONG-TERM CURRENT USE OF INSULIN (HCC): ICD-10-CM

## 2020-10-27 RX ORDER — GLIPIZIDE 10 MG/1
TABLET, FILM COATED, EXTENDED RELEASE ORAL
Qty: 180 TABLET | Refills: 1 | Status: SHIPPED | OUTPATIENT
Start: 2020-10-27 | End: 2021-05-03

## 2020-10-27 RX ORDER — SITAGLIPTIN AND METFORMIN HYDROCHLORIDE 50; 1000 MG/1; MG/1
TABLET, FILM COATED ORAL
Qty: 180 TABLET | Refills: 1 | Status: SHIPPED | OUTPATIENT
Start: 2020-10-27 | End: 2021-05-03

## 2020-11-11 RX ORDER — WARFARIN SODIUM 1 MG/1
TABLET ORAL
Qty: 32 TABLET | Refills: 5 | Status: SHIPPED | OUTPATIENT
Start: 2020-11-11 | End: 2021-05-03

## 2020-11-11 NOTE — TELEPHONE ENCOUNTER
Last Ov: 10/19/20, TB, CPE  Last labs: PT, CBC, TSH w Ref, A1c, BMP 10/6/20  Last Rx: warfarin 1mg, #32, 5R 12/20/19    Future Appointments   Date Time Provider Marilyn Frank   3/8/2021  1:40 PM Fannie Lincoln MD [facility]       Per [employer]

## 2020-11-13 NOTE — TELEPHONE ENCOUNTER
Future Appointments   Date Time Provider Marilyn Frank   11/16/2020  8:45 AM REFERENCE [id number]

## 2020-11-16 ENCOUNTER — LAB ENCOUNTER (OUTPATIENT)
Dept: LAB | Age: 75
End: 2020-11-16
Attending: INTERNAL MEDICINE
Payer: MEDICARE

## 2020-11-16 DIAGNOSIS — I48.20 CHRONIC ATRIAL FIBRILLATION (HCC): ICD-10-CM

## 2020-11-16 PROCEDURE — 85610 PROTHROMBIN TIME: CPT

## 2020-11-16 PROCEDURE — 36415 COLL VENOUS BLD VENIPUNCTURE: CPT

## 2020-11-23 DIAGNOSIS — E78.5 DYSLIPIDEMIA: ICD-10-CM

## 2020-11-23 DIAGNOSIS — I48.20 CHRONIC ATRIAL FIBRILLATION (HCC): ICD-10-CM

## 2020-11-23 RX ORDER — ROSUVASTATIN CALCIUM 20 MG/1
TABLET, COATED ORAL
Qty: 90 TABLET | Refills: 1 | Status: SHIPPED | OUTPATIENT
Start: 2020-11-23 | End: 2021-05-25

## 2020-11-23 RX ORDER — WARFARIN SODIUM 5 MG/1
TABLET ORAL
Qty: 120 TABLET | Refills: 1 | Status: SHIPPED | OUTPATIENT
Start: 2020-11-23 | End: 2021-04-01

## 2020-11-23 NOTE — TELEPHONE ENCOUNTER
Last Ov: 10/19/20, TB, CPE  Last labs: PT, CBC, TSH w Ref, A1c, BMP 10/6/20  Last Rx: warfarin 5mg, #120, 1R 7/20/20    Future Appointments   Date Time Provider Marilyn Frank   3/8/2021  1:40 PM Elder Fuller MD [facility] [address] [facility]       [facility]

## 2021-02-01 DIAGNOSIS — Z23 NEED FOR VACCINATION: ICD-10-CM

## 2021-02-28 DIAGNOSIS — E11.9 TYPE 2 DIABETES MELLITUS WITHOUT COMPLICATION, WITHOUT LONG-TERM CURRENT USE OF INSULIN (HCC): ICD-10-CM

## 2021-03-01 RX ORDER — CANAGLIFLOZIN 300 MG/1
TABLET, FILM COATED ORAL
Qty: 90 TABLET | Refills: 1 | Status: SHIPPED | OUTPATIENT
Start: 2021-03-01 | End: 2021-09-08

## 2021-03-04 ENCOUNTER — IMMUNIZATION (OUTPATIENT)
Dept: LAB | Facility: HOSPITAL | Age: 76
End: 2021-03-04
Attending: HOSPITALIST
Payer: MEDICARE

## 2021-03-04 DIAGNOSIS — Z23 NEED FOR VACCINATION: Primary | ICD-10-CM

## 2021-03-04 PROCEDURE — 0011A SARSCOV2 VAC 100MCG/0.5ML IM: CPT

## 2021-03-09 ENCOUNTER — LAB ENCOUNTER (OUTPATIENT)
Dept: LAB | Age: 76
End: 2021-03-09
Attending: INTERNAL MEDICINE
Payer: MEDICARE

## 2021-03-09 DIAGNOSIS — E11.40 TYPE 2 DIABETES MELLITUS WITH DIABETIC NEUROPATHY, WITHOUT LONG-TERM CURRENT USE OF INSULIN (HCC): ICD-10-CM

## 2021-03-09 DIAGNOSIS — D69.6 THROMBOCYTOPENIA (HCC): ICD-10-CM

## 2021-03-09 DIAGNOSIS — E78.00 PURE HYPERCHOLESTEROLEMIA: ICD-10-CM

## 2021-03-09 DIAGNOSIS — I48.20 CHRONIC ATRIAL FIBRILLATION (HCC): ICD-10-CM

## 2021-03-09 DIAGNOSIS — I10 ESSENTIAL HYPERTENSION: ICD-10-CM

## 2021-03-09 LAB
ALBUMIN SERPL-MCNC: 4.1 G/DL (ref 3.4–5)
ALBUMIN/GLOB SERPL: 1.3 {RATIO} (ref 1–2)
ALP LIVER SERPL-CCNC: 42 U/L
ALT SERPL-CCNC: 29 U/L
ANION GAP SERPL CALC-SCNC: 3 MMOL/L (ref 0–18)
AST SERPL-CCNC: 11 U/L (ref 15–37)
BASOPHILS # BLD AUTO: 0.04 X10(3) UL (ref 0–0.2)
BASOPHILS NFR BLD AUTO: 0.5 %
BILIRUB SERPL-MCNC: 0.4 MG/DL (ref 0.1–2)
BUN BLD-MCNC: 22 MG/DL (ref 7–18)
BUN/CREAT SERPL: 19.6 (ref 10–20)
CALCIUM BLD-MCNC: 9.4 MG/DL (ref 8.5–10.1)
CHLORIDE SERPL-SCNC: 107 MMOL/L (ref 98–112)
CHOLEST SMN-MCNC: 161 MG/DL (ref ?–200)
CO2 SERPL-SCNC: 29 MMOL/L (ref 21–32)
CREAT BLD-MCNC: 1.12 MG/DL
CREAT UR-SCNC: 105 MG/DL
DEPRECATED RDW RBC AUTO: 44.5 FL (ref 35.1–46.3)
EOSINOPHIL # BLD AUTO: 0.33 X10(3) UL (ref 0–0.7)
EOSINOPHIL NFR BLD AUTO: 4.1 %
ERYTHROCYTE [DISTWIDTH] IN BLOOD BY AUTOMATED COUNT: 13 % (ref 11–15)
EST. AVERAGE GLUCOSE BLD GHB EST-MCNC: 169 MG/DL (ref 68–126)
GLOBULIN PLAS-MCNC: 3.2 G/DL (ref 2.8–4.4)
GLUCOSE BLD-MCNC: 160 MG/DL (ref 70–99)
HBA1C MFR BLD HPLC: 7.5 % (ref ?–5.7)
HCT VFR BLD AUTO: 49.8 %
HDLC SERPL-MCNC: 44 MG/DL (ref 40–59)
HGB BLD-MCNC: 16.5 G/DL
IMM GRANULOCYTES # BLD AUTO: 0.03 X10(3) UL (ref 0–1)
IMM GRANULOCYTES NFR BLD: 0.4 %
LDLC SERPL CALC-MCNC: 92 MG/DL (ref ?–100)
LYMPHOCYTES # BLD AUTO: 2.09 X10(3) UL (ref 1–4)
LYMPHOCYTES NFR BLD AUTO: 25.7 %
M PROTEIN MFR SERPL ELPH: 7.3 G/DL (ref 6.4–8.2)
MCH RBC QN AUTO: 31 PG (ref 26–34)
MCHC RBC AUTO-ENTMCNC: 33.1 G/DL (ref 31–37)
MCV RBC AUTO: 93.6 FL
MICROALBUMIN UR-MCNC: 1.67 MG/DL
MICROALBUMIN/CREAT 24H UR-RTO: 15.9 UG/MG (ref ?–30)
MONOCYTES # BLD AUTO: 0.75 X10(3) UL (ref 0.1–1)
MONOCYTES NFR BLD AUTO: 9.2 %
NEUTROPHILS # BLD AUTO: 4.89 X10 (3) UL (ref 1.5–7.7)
NEUTROPHILS # BLD AUTO: 4.89 X10(3) UL (ref 1.5–7.7)
NEUTROPHILS NFR BLD AUTO: 60.1 %
NONHDLC SERPL-MCNC: 117 MG/DL (ref ?–130)
OSMOLALITY SERPL CALC.SUM OF ELEC: 295 MOSM/KG (ref 275–295)
PATIENT FASTING Y/N/NP: YES
PATIENT FASTING Y/N/NP: YES
PLATELET # BLD AUTO: 151 10(3)UL (ref 150–450)
POTASSIUM SERPL-SCNC: 4.8 MMOL/L (ref 3.5–5.1)
RBC # BLD AUTO: 5.32 X10(6)UL
SODIUM SERPL-SCNC: 139 MMOL/L (ref 136–145)
TRIGL SERPL-MCNC: 126 MG/DL (ref 30–149)
VLDLC SERPL CALC-MCNC: 25 MG/DL (ref 0–30)
WBC # BLD AUTO: 8.1 X10(3) UL (ref 4–11)

## 2021-03-09 PROCEDURE — 80061 LIPID PANEL: CPT

## 2021-03-09 PROCEDURE — 82570 ASSAY OF URINE CREATININE: CPT

## 2021-03-09 PROCEDURE — 83036 HEMOGLOBIN GLYCOSYLATED A1C: CPT

## 2021-03-09 PROCEDURE — 85025 COMPLETE CBC W/AUTO DIFF WBC: CPT

## 2021-03-09 PROCEDURE — 82043 UR ALBUMIN QUANTITATIVE: CPT

## 2021-03-09 PROCEDURE — 36415 COLL VENOUS BLD VENIPUNCTURE: CPT

## 2021-03-09 PROCEDURE — 80053 COMPREHEN METABOLIC PANEL: CPT

## 2021-03-15 ENCOUNTER — OFFICE VISIT (OUTPATIENT)
Dept: INTERNAL MEDICINE CLINIC | Facility: CLINIC | Age: 76
End: 2021-03-15
Payer: MEDICARE

## 2021-03-15 ENCOUNTER — LAB ENCOUNTER (OUTPATIENT)
Dept: LAB | Age: 76
End: 2021-03-15
Attending: INTERNAL MEDICINE
Payer: MEDICARE

## 2021-03-15 VITALS
HEIGHT: 72.84 IN | HEART RATE: 90 BPM | WEIGHT: 195 LBS | SYSTOLIC BLOOD PRESSURE: 128 MMHG | OXYGEN SATURATION: 97 % | TEMPERATURE: 99 F | BODY MASS INDEX: 25.84 KG/M2 | DIASTOLIC BLOOD PRESSURE: 64 MMHG

## 2021-03-15 DIAGNOSIS — E11.40 TYPE 2 DIABETES MELLITUS WITH DIABETIC NEUROPATHY, WITHOUT LONG-TERM CURRENT USE OF INSULIN (HCC): ICD-10-CM

## 2021-03-15 DIAGNOSIS — D69.6 THROMBOCYTOPENIA (HCC): ICD-10-CM

## 2021-03-15 DIAGNOSIS — I48.20 CHRONIC ATRIAL FIBRILLATION (HCC): Primary | ICD-10-CM

## 2021-03-15 DIAGNOSIS — I10 ESSENTIAL HYPERTENSION: ICD-10-CM

## 2021-03-15 DIAGNOSIS — I48.20 CHRONIC ATRIAL FIBRILLATION (HCC): ICD-10-CM

## 2021-03-15 DIAGNOSIS — E78.5 DYSLIPIDEMIA: ICD-10-CM

## 2021-03-15 LAB
INR BLD: 1.98 (ref 0.89–1.11)
PSA SERPL DL<=0.01 NG/ML-MCNC: 23 SECONDS (ref 12.4–14.6)

## 2021-03-15 PROCEDURE — 85610 PROTHROMBIN TIME: CPT

## 2021-03-15 PROCEDURE — 99214 OFFICE O/P EST MOD 30 MIN: CPT | Performed by: INTERNAL MEDICINE

## 2021-03-15 PROCEDURE — 36415 COLL VENOUS BLD VENIPUNCTURE: CPT

## 2021-03-15 NOTE — PROGRESS NOTES
Laqueta Dandy is a 67year old male. Patient presents with: Follow - Up: mn room 3 pt here to follow up on multiple issues      HPI:     Patient back from [facility], was not as active as usual last few weeks.  Overall feels well, had 1 of 2 covid 19 vaccine CITRATE 50 MG Oral Tab TAKE 1 TABLET (50 MG TOTAL) BY MOUTH DAILY AS NEEDED FOR ERECTILE DYSFUNCTION.  10 tablet 3   • METOPROLOL SUCCINATE  MG Oral Tablet 24 Hr TAKE 1/2 TABLET BY MOUTH DAILY 45 tablet 2   • CARTIA  MG Oral Capsule SR 24 Hr BENITA occ dry cough  CARDIOVASCULAR: denies chest pain  GI: denies abdominal pain  : no dysuria  NEURO: denies headaches  PSYCH: No reported depression   RHEUM: No reported joint swelling  HEME: No adenopathy      EXAM:   /64 (BP Location: Left arm, Alyson plan.

## 2021-04-01 ENCOUNTER — IMMUNIZATION (OUTPATIENT)
Dept: LAB | Facility: HOSPITAL | Age: 76
End: 2021-04-01
Attending: EMERGENCY MEDICINE
Payer: MEDICARE

## 2021-04-01 DIAGNOSIS — I48.20 CHRONIC ATRIAL FIBRILLATION (HCC): ICD-10-CM

## 2021-04-01 DIAGNOSIS — Z23 NEED FOR VACCINATION: Primary | ICD-10-CM

## 2021-04-01 PROCEDURE — 0012A SARSCOV2 VAC 100MCG/0.5ML IM: CPT

## 2021-04-01 RX ORDER — WARFARIN SODIUM 5 MG/1
TABLET ORAL
Qty: 120 TABLET | Refills: 1 | Status: SHIPPED | OUTPATIENT
Start: 2021-04-01 | End: 2021-07-27

## 2021-04-01 NOTE — TELEPHONE ENCOUNTER
Last Ov: 3/15/21, TB, F/U  Last labs: PT 3/1/21  Last Rx: warfarin 5mg, #120, #1 11/23/20    Future Appointments   Date Time Provider Marilyn Frank   9/10/2021 10:00 AM Devin Gramajo MD [facility]       Per Protocol - not on protocol, Rx pe

## 2021-05-03 DIAGNOSIS — E11.40 TYPE 2 DIABETES MELLITUS WITH DIABETIC NEUROPATHY, WITHOUT LONG-TERM CURRENT USE OF INSULIN (HCC): ICD-10-CM

## 2021-05-03 RX ORDER — GLIPIZIDE 10 MG/1
TABLET, FILM COATED, EXTENDED RELEASE ORAL
Qty: 180 TABLET | Refills: 1 | Status: SHIPPED | OUTPATIENT
Start: 2021-05-03 | End: 2021-10-29

## 2021-05-03 RX ORDER — WARFARIN SODIUM 1 MG/1
TABLET ORAL
Qty: 32 TABLET | Refills: 5 | Status: SHIPPED | OUTPATIENT
Start: 2021-05-03

## 2021-05-03 RX ORDER — SITAGLIPTIN AND METFORMIN HYDROCHLORIDE 50; 1000 MG/1; MG/1
TABLET, FILM COATED ORAL
Qty: 180 TABLET | Refills: 1 | Status: SHIPPED | OUTPATIENT
Start: 2021-05-03 | End: 2021-11-29

## 2021-05-03 NOTE — TELEPHONE ENCOUNTER
Last Ov: 3/15/21, TB, F/U  Last labs: A1c, CMP, Lipid, Microalb/creat, CBC 3/9/21  Last Rx:   Glipizide ER 10mg, #180, 1R 10/27/20  Janumet 50/1000mg, #180, 1R 10/27/20  Warfarin 1mg, #32, 5R 11/11/20    Future Appointments   Date Time Provider Department

## 2021-05-07 ENCOUNTER — TELEPHONE (OUTPATIENT)
Dept: INTERNAL MEDICINE CLINIC | Facility: CLINIC | Age: 76
End: 2021-05-07

## 2021-05-25 DIAGNOSIS — E78.5 DYSLIPIDEMIA: ICD-10-CM

## 2021-05-25 RX ORDER — ROSUVASTATIN CALCIUM 20 MG/1
TABLET, COATED ORAL
Qty: 90 TABLET | Refills: 1 | Status: SHIPPED | OUTPATIENT
Start: 2021-05-25 | End: 2021-11-22

## 2021-06-10 ENCOUNTER — MED REC SCAN ONLY (OUTPATIENT)
Dept: INTERNAL MEDICINE CLINIC | Facility: CLINIC | Age: 76
End: 2021-06-10

## 2021-06-16 ENCOUNTER — LAB ENCOUNTER (OUTPATIENT)
Dept: LAB | Age: 76
End: 2021-06-16
Attending: INTERNAL MEDICINE
Payer: MEDICARE

## 2021-06-16 DIAGNOSIS — I48.20 CHRONIC ATRIAL FIBRILLATION (HCC): ICD-10-CM

## 2021-06-16 PROCEDURE — 36415 COLL VENOUS BLD VENIPUNCTURE: CPT

## 2021-06-16 PROCEDURE — 85610 PROTHROMBIN TIME: CPT

## 2021-07-13 ENCOUNTER — LAB ENCOUNTER (OUTPATIENT)
Dept: LAB | Age: 76
End: 2021-07-13
Attending: INTERNAL MEDICINE
Payer: MEDICARE

## 2021-07-13 DIAGNOSIS — I48.20 CHRONIC ATRIAL FIBRILLATION (HCC): ICD-10-CM

## 2021-07-13 LAB
INR BLD: 2.07 (ref 0.89–1.11)
PSA SERPL DL<=0.01 NG/ML-MCNC: 23.8 SECONDS (ref 12.4–14.6)

## 2021-07-13 PROCEDURE — 85610 PROTHROMBIN TIME: CPT

## 2021-07-13 PROCEDURE — 36415 COLL VENOUS BLD VENIPUNCTURE: CPT

## 2021-07-26 DIAGNOSIS — I48.20 CHRONIC ATRIAL FIBRILLATION (HCC): ICD-10-CM

## 2021-07-27 RX ORDER — WARFARIN SODIUM 5 MG/1
TABLET ORAL
Qty: 120 TABLET | Refills: 1 | Status: SHIPPED | OUTPATIENT
Start: 2021-07-27 | End: 2021-12-02

## 2021-07-28 NOTE — TELEPHONE ENCOUNTER
Last seen 3/15/21   Leslie Yousif RN   7/15/2021 10:59 AM CDT Back to Top      Patient notified INR=2.07 at target range, continue same Coumadin dose of 10mg daily and repeat INR in one month.  Pt verbalizes understanding.

## 2021-09-07 DIAGNOSIS — E11.9 TYPE 2 DIABETES MELLITUS WITHOUT COMPLICATION, WITHOUT LONG-TERM CURRENT USE OF INSULIN (HCC): ICD-10-CM

## 2021-09-08 RX ORDER — CANAGLIFLOZIN 300 MG/1
TABLET, FILM COATED ORAL
Qty: 90 TABLET | Refills: 0 | Status: SHIPPED | OUTPATIENT
Start: 2021-09-08 | End: 2021-12-02

## 2021-09-13 NOTE — TELEPHONE ENCOUNTER
Been Following TB  Last OV 3/15/21  Last CPE 10/19/20  Last Labs A1c, CMP, Lipid, Microalb/creat, CBC 3/9/21    Last Rx fill invokana 300mg #90 1R 3/1/21    Future Appointments   Date Time Provider Marilyn Frank   9/10/2021 10:00 AM Elder Fuller MD
Future Appointments   Date Time Provider Marilyn Monika   11/2/2021  1:00 PM Mariangel Bain MD [facility]   3/10/2022  2:00 PM Beatris Hirsch MD [facility]
Refill given, schedule extended f/u for DM, etc
Alert and oriented, no focal deficits, no motor or sensory deficits.

## 2021-09-27 DIAGNOSIS — N52.9 ERECTILE DYSFUNCTION, UNSPECIFIED ERECTILE DYSFUNCTION TYPE: ICD-10-CM

## 2021-09-28 NOTE — TELEPHONE ENCOUNTER
Been Following TB  Last OV 3/15/21  Last CPE 10/19/20  Last Labs A1c, CMP, Lipid, Microalb/creat, CBC 3/9/21    Last Rx fill Sildenafil 50mg #10 3R 10/14/20    Future Appointments   Date Time Provider Marilyn Frank   11/2/2021  1:00 PM Michael Singer,

## 2021-09-29 RX ORDER — SILDENAFIL 50 MG/1
50 TABLET, FILM COATED ORAL
Qty: 10 TABLET | Refills: 3 | Status: SHIPPED | OUTPATIENT
Start: 2021-09-29

## 2021-10-29 DIAGNOSIS — E11.40 TYPE 2 DIABETES MELLITUS WITH DIABETIC NEUROPATHY, WITHOUT LONG-TERM CURRENT USE OF INSULIN (HCC): ICD-10-CM

## 2021-10-29 NOTE — TELEPHONE ENCOUNTER
Been Following  TB  Last OV  3/15/21  Last CPE  10/19/20  Last Labs  Cbc, lipid, cmp 3/9/21     Last Rx fill    Medication Quantity Refills Start End   GLIPIZIDE ER 10 MG Oral Tablet 24 Hr 180 tablet 1 5/3/2021        Future Appointments   Date Time Provid

## 2021-10-30 RX ORDER — GLIPIZIDE 10 MG/1
TABLET, FILM COATED, EXTENDED RELEASE ORAL
Qty: 180 TABLET | Refills: 0 | Status: SHIPPED | OUTPATIENT
Start: 2021-10-30 | End: 2022-01-27

## 2021-11-02 ENCOUNTER — LAB ENCOUNTER (OUTPATIENT)
Dept: LAB | Age: 76
End: 2021-11-02
Attending: INTERNAL MEDICINE
Payer: MEDICARE

## 2021-11-02 ENCOUNTER — OFFICE VISIT (OUTPATIENT)
Dept: INTERNAL MEDICINE CLINIC | Facility: CLINIC | Age: 76
End: 2021-11-02
Payer: MEDICARE

## 2021-11-02 VITALS
TEMPERATURE: 97 F | WEIGHT: 195 LBS | SYSTOLIC BLOOD PRESSURE: 110 MMHG | BODY MASS INDEX: 26.41 KG/M2 | RESPIRATION RATE: 16 BRPM | HEART RATE: 83 BPM | DIASTOLIC BLOOD PRESSURE: 60 MMHG | HEIGHT: 72 IN

## 2021-11-02 DIAGNOSIS — I48.20 CHRONIC ATRIAL FIBRILLATION (HCC): ICD-10-CM

## 2021-11-02 DIAGNOSIS — E11.40 TYPE 2 DIABETES MELLITUS WITH DIABETIC NEUROPATHY, WITHOUT LONG-TERM CURRENT USE OF INSULIN (HCC): Primary | ICD-10-CM

## 2021-11-02 DIAGNOSIS — E11.40 TYPE 2 DIABETES MELLITUS WITH DIABETIC NEUROPATHY, WITHOUT LONG-TERM CURRENT USE OF INSULIN (HCC): ICD-10-CM

## 2021-11-02 DIAGNOSIS — I10 ESSENTIAL HYPERTENSION: ICD-10-CM

## 2021-11-02 PROCEDURE — 99214 OFFICE O/P EST MOD 30 MIN: CPT | Performed by: INTERNAL MEDICINE

## 2021-11-02 PROCEDURE — 80048 BASIC METABOLIC PNL TOTAL CA: CPT

## 2021-11-02 PROCEDURE — 85610 PROTHROMBIN TIME: CPT

## 2021-11-02 PROCEDURE — 36415 COLL VENOUS BLD VENIPUNCTURE: CPT

## 2021-11-02 PROCEDURE — 83036 HEMOGLOBIN GLYCOSYLATED A1C: CPT

## 2021-11-02 NOTE — PROGRESS NOTES
Maryjo Ren is a 67year old male. Patient presents with:   Follow - Up: SN RM 3; Med review, condition review      HPI:     Patient here for f/u-  Chronic a fib- no INR drawn for 4 months, pt reports no bleeding or bruising, he will do INR today, did no MEALS. 180 tablet 1   • Warfarin Sodium 1 MG Oral Tab take 10 MG BY MOUTH SIX DAYS a WEEK AND 9 MG ONE DAY a WEEK 32 tablet 5   • Clobetasol Propionate (TEMOVATE) 0.05 % Apply Externally Ointment Apply 1 Application topically as needed.   0   • Cholecalcife adenopathy      EXAM:   /60 (BP Location: Left arm, Patient Position: Sitting, Cuff Size: adult)   Pulse 83   Temp 97 °F (36.1 °C) (Temporal)   Resp 16   Ht 6' (1.829 m)   Wt 195 lb (88.5 kg)   BMI 26.45 kg/m²   GENERAL: well developed, NAD  NECK: [place]

## 2021-11-15 ENCOUNTER — LAB ENCOUNTER (OUTPATIENT)
Dept: LAB | Age: 76
End: 2021-11-15
Attending: INTERNAL MEDICINE
Payer: MEDICARE

## 2021-11-15 DIAGNOSIS — I48.20 CHRONIC ATRIAL FIBRILLATION (HCC): ICD-10-CM

## 2021-11-15 PROCEDURE — 36415 COLL VENOUS BLD VENIPUNCTURE: CPT

## 2021-11-15 PROCEDURE — 85610 PROTHROMBIN TIME: CPT

## 2021-11-22 DIAGNOSIS — E78.5 DYSLIPIDEMIA: ICD-10-CM

## 2021-11-22 RX ORDER — ROSUVASTATIN CALCIUM 20 MG/1
TABLET, COATED ORAL
Qty: 90 TABLET | Refills: 1 | Status: SHIPPED | OUTPATIENT
Start: 2021-11-22

## 2021-11-29 DIAGNOSIS — E11.40 TYPE 2 DIABETES MELLITUS WITH DIABETIC NEUROPATHY, WITHOUT LONG-TERM CURRENT USE OF INSULIN (HCC): ICD-10-CM

## 2021-11-29 RX ORDER — SITAGLIPTIN AND METFORMIN HYDROCHLORIDE 50; 1000 MG/1; MG/1
TABLET, FILM COATED ORAL
Qty: 180 TABLET | Refills: 1 | Status: SHIPPED | OUTPATIENT
Start: 2021-11-29

## 2021-11-29 NOTE — TELEPHONE ENCOUNTER
Last VISIT - 11/2/21 Med f/u    Last CPE - 10/19/20    Last REFILL -     JANUMET  MG Oral Tab 180 tablet 1 5/3/2021     Last LABS - 11/2/21 bmp      Per PROTOCOL? FAILED    Please Approve or Deny.

## 2021-12-02 DIAGNOSIS — I48.20 CHRONIC ATRIAL FIBRILLATION (HCC): ICD-10-CM

## 2021-12-02 DIAGNOSIS — E11.9 TYPE 2 DIABETES MELLITUS WITHOUT COMPLICATION, WITHOUT LONG-TERM CURRENT USE OF INSULIN (HCC): ICD-10-CM

## 2021-12-02 RX ORDER — CANAGLIFLOZIN 300 MG/1
TABLET, FILM COATED ORAL
Qty: 90 TABLET | Refills: 1 | Status: SHIPPED | OUTPATIENT
Start: 2021-12-02

## 2021-12-02 RX ORDER — WARFARIN SODIUM 5 MG/1
TABLET ORAL
Qty: 120 TABLET | Refills: 1 | Status: SHIPPED | OUTPATIENT
Start: 2021-12-02

## 2021-12-02 NOTE — TELEPHONE ENCOUNTER
Last VISIT - 11/2/21 F/U    Last CPE - 10/19/20    Last REFILL -     INVOKANA 300 MG Oral Tab 90 tablet 0 9/8/2021     WARFARIN 5 MG Oral Tab 120 tablet 1 7/27/2021     Last LABS - 11/2/21 BMP      Per PROTOCOL? FAILED    Please Approve or Deny.

## 2022-01-27 DIAGNOSIS — E11.40 TYPE 2 DIABETES MELLITUS WITH DIABETIC NEUROPATHY, WITHOUT LONG-TERM CURRENT USE OF INSULIN (HCC): ICD-10-CM

## 2022-01-27 RX ORDER — GLIPIZIDE 10 MG/1
TABLET, FILM COATED, EXTENDED RELEASE ORAL
Qty: 180 TABLET | Refills: 0 | Status: SHIPPED | OUTPATIENT
Start: 2022-01-27

## 2022-01-27 NOTE — TELEPHONE ENCOUNTER
Last VISIT - 11/2/21 f/u for med    Last CPE - 10/19/20    Last REFILL -     GLIPIZIDE 10 MG Oral Tablet 24 Hr 180 tablet 0 10/30/2021     Last LABS - 11/2/21 bmp      Per PROTOCOL? FAILED    Please Approve or Deny.

## 2022-03-28 ENCOUNTER — TELEPHONE (OUTPATIENT)
Dept: INTERNAL MEDICINE CLINIC | Facility: CLINIC | Age: 77
End: 2022-03-28

## 2022-04-19 ENCOUNTER — LAB ENCOUNTER (OUTPATIENT)
Dept: LAB | Age: 77
End: 2022-04-19
Attending: INTERNAL MEDICINE
Payer: MEDICARE

## 2022-04-19 DIAGNOSIS — E78.00 HYPERCHOLESTEROLEMIA: ICD-10-CM

## 2022-04-19 DIAGNOSIS — I10 ESSENTIAL HYPERTENSION: ICD-10-CM

## 2022-04-19 DIAGNOSIS — I48.20 CHRONIC ATRIAL FIBRILLATION (HCC): ICD-10-CM

## 2022-04-19 LAB
ALBUMIN SERPL-MCNC: 3.8 G/DL (ref 3.4–5)
ALBUMIN/GLOB SERPL: 1.2 {RATIO} (ref 1–2)
ALP LIVER SERPL-CCNC: 44 U/L
ALT SERPL-CCNC: 34 U/L
ANION GAP SERPL CALC-SCNC: 6 MMOL/L (ref 0–18)
AST SERPL-CCNC: 15 U/L (ref 15–37)
BILIRUB SERPL-MCNC: 0.4 MG/DL (ref 0.1–2)
BUN BLD-MCNC: 19 MG/DL (ref 7–18)
CALCIUM BLD-MCNC: 8.8 MG/DL (ref 8.5–10.1)
CHLORIDE SERPL-SCNC: 104 MMOL/L (ref 98–112)
CHOLEST SERPL-MCNC: 116 MG/DL (ref ?–200)
CO2 SERPL-SCNC: 29 MMOL/L (ref 21–32)
CREAT BLD-MCNC: 1 MG/DL
EST. AVERAGE GLUCOSE BLD GHB EST-MCNC: 169 MG/DL (ref 68–126)
FASTING PATIENT LIPID ANSWER: YES
FASTING STATUS PATIENT QL REPORTED: YES
GLOBULIN PLAS-MCNC: 3.2 G/DL (ref 2.8–4.4)
GLUCOSE BLD-MCNC: 160 MG/DL (ref 70–99)
HBA1C MFR BLD: 7.5 % (ref ?–5.7)
HDLC SERPL-MCNC: 37 MG/DL (ref 40–59)
INR BLD: 2.97 (ref 0.8–1.2)
LDLC SERPL CALC-MCNC: 57 MG/DL (ref ?–100)
NONHDLC SERPL-MCNC: 79 MG/DL (ref ?–130)
OSMOLALITY SERPL CALC.SUM OF ELEC: 294 MOSM/KG (ref 275–295)
POTASSIUM SERPL-SCNC: 4.6 MMOL/L (ref 3.5–5.1)
PROT SERPL-MCNC: 7 G/DL (ref 6.4–8.2)
PROTHROMBIN TIME: 31.1 SECONDS (ref 11.6–14.8)
SODIUM SERPL-SCNC: 139 MMOL/L (ref 136–145)
TRIGL SERPL-MCNC: 125 MG/DL (ref 30–149)
VLDLC SERPL CALC-MCNC: 18 MG/DL (ref 0–30)

## 2022-04-19 PROCEDURE — 80061 LIPID PANEL: CPT

## 2022-04-19 PROCEDURE — 83036 HEMOGLOBIN GLYCOSYLATED A1C: CPT

## 2022-04-19 PROCEDURE — 36415 COLL VENOUS BLD VENIPUNCTURE: CPT

## 2022-04-19 PROCEDURE — 85610 PROTHROMBIN TIME: CPT

## 2022-04-19 PROCEDURE — 80053 COMPREHEN METABOLIC PANEL: CPT

## 2022-04-29 ENCOUNTER — TELEPHONE (OUTPATIENT)
Dept: INTERNAL MEDICINE CLINIC | Facility: CLINIC | Age: 77
End: 2022-04-29

## 2022-04-29 NOTE — TELEPHONE ENCOUNTER
Received Excision and complex repair operative report from [facility]. Put in nurses bin for review.

## 2022-04-29 NOTE — TELEPHONE ENCOUNTER
Last VISIT - 11/2/21 Med review, condition review    Last CPE - No recent physical    Last REFILL -     GLIPIZIDE ER 10 MG Oral Tablet 24 Hr 180 tablet 0 1/27/2022     Last LABS - 4/19/22 lipid, A1C, cmp    No future appointments. Per PROTOCOL? FAILED    Please Approve or Deny.

## 2022-04-30 RX ORDER — GLIPIZIDE 10 MG/1
TABLET, FILM COATED, EXTENDED RELEASE ORAL
Qty: 180 TABLET | Refills: 0 | Status: SHIPPED | OUTPATIENT
Start: 2022-04-30

## 2022-05-03 ENCOUNTER — MED REC SCAN ONLY (OUTPATIENT)
Dept: INTERNAL MEDICINE CLINIC | Facility: CLINIC | Age: 77
End: 2022-05-03

## 2022-05-05 ENCOUNTER — TELEPHONE (OUTPATIENT)
Dept: INTERNAL MEDICINE CLINIC | Facility: CLINIC | Age: 77
End: 2022-05-05

## 2022-05-06 ENCOUNTER — TELEPHONE (OUTPATIENT)
Dept: INTERNAL MEDICINE CLINIC | Facility: CLINIC | Age: 77
End: 2022-05-06

## 2022-05-11 ENCOUNTER — TELEPHONE (OUTPATIENT)
Dept: INTERNAL MEDICINE CLINIC | Facility: CLINIC | Age: 77
End: 2022-05-11

## 2022-05-11 RX ORDER — SITAGLIPTIN AND METFORMIN HYDROCHLORIDE 1000; 50 MG/1; MG/1
TABLET, FILM COATED ORAL
Qty: 180 TABLET | Refills: 0 | Status: SHIPPED | OUTPATIENT
Start: 2022-05-11

## 2022-05-11 NOTE — TELEPHONE ENCOUNTER
Last VISIT - 11/2/21 3; Med review, condition review    Last CPE - No recent physical    Last REFILL -     JANUMET  MG Oral Tab 180 tablet 1 11/29/2021     Last LABS - 4/19/22 lipid, a1c, cmp    No future appointments. Per PROTOCOL? FAILED    Please Approve or Deny. Rose is here today for   Chief Complaint   Patient presents with   • Follow-up     1 week follow up .   .        Medication Refills needed today?  NO,   if you receive a prescription today would you like it to be sent to [facility]? NO    Medications: medications verified and updated    Patient would like communication of their results via:      Cell Phone:   Telephone Information:   Mobile [phone]     Okay to leave a message containing results? Yes    Tobacco history: verified      Health Maintenance Summary     COVID-19 Vaccine (3 - Moderna risk 3-dose series)  Overdue since 4/16/2021    Pneumococcal Vaccine 0-64 (2 of 4 - PPSV23)  Next due on 12/20/2021    DTaP/Tdap/Td Vaccine (3 - Td or Tdap)  Next due on 7/22/2029    Colorectal Cancer Screen- (Colonoscopy - Every 10 Years)  Next due on 11/6/2029    Hepatitis C Screening   Completed    Shingles Vaccine   Completed    Influenza Vaccine   Completed    Hepatitis B Vaccine   Aged Out    Meningococcal Vaccine   Aged Out    HPV Vaccine   Aged Out          Patient will discuss with PCP.    COVID-19 Vaccine Interest Assessment:   • Patient reported already received outside of [facility]  If the patient reports an outside immunization, please update the WIR/ICARE information in the Immunizations activity

## 2022-05-12 RX ORDER — SITAGLIPTIN AND METFORMIN HYDROCHLORIDE 1000; 50 MG/1; MG/1
1 TABLET, FILM COATED ORAL 2 TIMES DAILY WITH MEALS
Qty: 180 TABLET | Refills: 1 | OUTPATIENT
Start: 2022-05-12

## 2022-05-16 ENCOUNTER — TELEPHONE (OUTPATIENT)
Dept: INTERNAL MEDICINE CLINIC | Facility: CLINIC | Age: 77
End: 2022-05-16

## 2022-05-18 ENCOUNTER — MED REC SCAN ONLY (OUTPATIENT)
Dept: INTERNAL MEDICINE CLINIC | Facility: CLINIC | Age: 77
End: 2022-05-18

## 2022-05-18 NOTE — PROGRESS NOTES
Dermatopathology report received from [facility]. Placed on TB desk for review.

## 2022-05-19 ENCOUNTER — TELEPHONE (OUTPATIENT)
Dept: INTERNAL MEDICINE CLINIC | Facility: CLINIC | Age: 77
End: 2022-05-19

## 2022-05-19 DIAGNOSIS — E78.5 DYSLIPIDEMIA: ICD-10-CM

## 2022-05-19 DIAGNOSIS — E11.40 TYPE 2 DIABETES MELLITUS WITH DIABETIC NEUROPATHY, WITHOUT LONG-TERM CURRENT USE OF INSULIN (HCC): ICD-10-CM

## 2022-05-19 DIAGNOSIS — Z00.00 ROUTINE GENERAL MEDICAL EXAMINATION AT A HEALTH CARE FACILITY: Primary | ICD-10-CM

## 2022-05-19 DIAGNOSIS — I10 ESSENTIAL HYPERTENSION: ICD-10-CM

## 2022-05-19 DIAGNOSIS — E04.2 NONTOXIC MULTINODULAR GOITER: ICD-10-CM

## 2022-05-19 NOTE — TELEPHONE ENCOUNTER
Orders to THE [facility] Pt aware to get labs done no sooner than 2 weeks prior to the appt. Pt aware to fast.  No call back required.   Future Appointments   Date Time Provider Marilyn Frank   5/31/2022  2:40 PM Kyara Townsend, V[facility]

## 2022-05-20 DIAGNOSIS — E78.5 DYSLIPIDEMIA: ICD-10-CM

## 2022-05-20 RX ORDER — ROSUVASTATIN CALCIUM 20 MG/1
TABLET, COATED ORAL
Qty: 90 TABLET | Refills: 1 | Status: SHIPPED | OUTPATIENT
Start: 2022-05-20

## 2022-05-24 ENCOUNTER — LAB ENCOUNTER (OUTPATIENT)
Dept: LAB | Age: 77
End: 2022-05-24
Attending: PHYSICIAN ASSISTANT
Payer: MEDICARE

## 2022-05-24 DIAGNOSIS — E78.5 DYSLIPIDEMIA: ICD-10-CM

## 2022-05-24 DIAGNOSIS — E11.40 TYPE 2 DIABETES MELLITUS WITH DIABETIC NEUROPATHY, WITHOUT LONG-TERM CURRENT USE OF INSULIN (HCC): ICD-10-CM

## 2022-05-24 DIAGNOSIS — I48.20 CHRONIC ATRIAL FIBRILLATION (HCC): ICD-10-CM

## 2022-05-24 DIAGNOSIS — Z00.00 ROUTINE GENERAL MEDICAL EXAMINATION AT A HEALTH CARE FACILITY: ICD-10-CM

## 2022-05-24 DIAGNOSIS — E04.2 NONTOXIC MULTINODULAR GOITER: ICD-10-CM

## 2022-05-24 DIAGNOSIS — I10 ESSENTIAL HYPERTENSION: ICD-10-CM

## 2022-05-24 LAB
ALBUMIN SERPL-MCNC: 4.1 G/DL (ref 3.4–5)
ALBUMIN/GLOB SERPL: 1.2 {RATIO} (ref 1–2)
ALP LIVER SERPL-CCNC: 44 U/L
ALT SERPL-CCNC: 23 U/L
ANION GAP SERPL CALC-SCNC: 5 MMOL/L (ref 0–18)
AST SERPL-CCNC: 22 U/L (ref 15–37)
BASOPHILS # BLD AUTO: 0.05 X10(3) UL (ref 0–0.2)
BASOPHILS NFR BLD AUTO: 0.7 %
BILIRUB SERPL-MCNC: 0.5 MG/DL (ref 0.1–2)
BUN BLD-MCNC: 16 MG/DL (ref 7–18)
CALCIUM BLD-MCNC: 9.3 MG/DL (ref 8.5–10.1)
CHLORIDE SERPL-SCNC: 104 MMOL/L (ref 98–112)
CHOLEST SERPL-MCNC: 154 MG/DL (ref ?–200)
CO2 SERPL-SCNC: 26 MMOL/L (ref 21–32)
CREAT BLD-MCNC: 0.96 MG/DL
CREAT UR-SCNC: 88.5 MG/DL
EOSINOPHIL # BLD AUTO: 0.31 X10(3) UL (ref 0–0.7)
EOSINOPHIL NFR BLD AUTO: 4.2 %
ERYTHROCYTE [DISTWIDTH] IN BLOOD BY AUTOMATED COUNT: 13.2 %
FASTING PATIENT LIPID ANSWER: YES
FASTING STATUS PATIENT QL REPORTED: YES
GLOBULIN PLAS-MCNC: 3.3 G/DL (ref 2.8–4.4)
GLUCOSE BLD-MCNC: 144 MG/DL (ref 70–99)
HCT VFR BLD AUTO: 49.2 %
HDLC SERPL-MCNC: 46 MG/DL (ref 40–59)
HGB BLD-MCNC: 16 G/DL
IMM GRANULOCYTES # BLD AUTO: 0.02 X10(3) UL (ref 0–1)
IMM GRANULOCYTES NFR BLD: 0.3 %
INR BLD: 2.22 (ref 0.8–1.2)
LDLC SERPL CALC-MCNC: 87 MG/DL (ref ?–100)
LYMPHOCYTES # BLD AUTO: 1.87 X10(3) UL (ref 1–4)
LYMPHOCYTES NFR BLD AUTO: 25.2 %
MCH RBC QN AUTO: 31.1 PG (ref 26–34)
MCHC RBC AUTO-ENTMCNC: 32.5 G/DL (ref 31–37)
MCV RBC AUTO: 95.7 FL
MICROALBUMIN UR-MCNC: 1.82 MG/DL
MICROALBUMIN/CREAT 24H UR-RTO: 20.6 UG/MG (ref ?–30)
MONOCYTES # BLD AUTO: 0.68 X10(3) UL (ref 0.1–1)
MONOCYTES NFR BLD AUTO: 9.2 %
NEUTROPHILS # BLD AUTO: 4.48 X10 (3) UL (ref 1.5–7.7)
NEUTROPHILS # BLD AUTO: 4.48 X10(3) UL (ref 1.5–7.7)
NEUTROPHILS NFR BLD AUTO: 60.4 %
NONHDLC SERPL-MCNC: 108 MG/DL (ref ?–130)
OSMOLALITY SERPL CALC.SUM OF ELEC: 284 MOSM/KG (ref 275–295)
PLATELET # BLD AUTO: 176 10(3)UL (ref 150–450)
POTASSIUM SERPL-SCNC: 4.7 MMOL/L (ref 3.5–5.1)
PROT SERPL-MCNC: 7.4 G/DL (ref 6.4–8.2)
PROTHROMBIN TIME: 24.7 SECONDS (ref 11.6–14.8)
RBC # BLD AUTO: 5.14 X10(6)UL
SODIUM SERPL-SCNC: 135 MMOL/L (ref 136–145)
TRIGL SERPL-MCNC: 114 MG/DL (ref 30–149)
TSI SER-ACNC: 1.45 MIU/ML (ref 0.36–3.74)
VLDLC SERPL CALC-MCNC: 18 MG/DL (ref 0–30)
WBC # BLD AUTO: 7.4 X10(3) UL (ref 4–11)

## 2022-05-24 PROCEDURE — 80053 COMPREHEN METABOLIC PANEL: CPT

## 2022-05-24 PROCEDURE — 84443 ASSAY THYROID STIM HORMONE: CPT

## 2022-05-24 PROCEDURE — 85025 COMPLETE CBC W/AUTO DIFF WBC: CPT

## 2022-05-24 PROCEDURE — 82043 UR ALBUMIN QUANTITATIVE: CPT

## 2022-05-24 PROCEDURE — 85610 PROTHROMBIN TIME: CPT

## 2022-05-24 PROCEDURE — 80061 LIPID PANEL: CPT

## 2022-05-24 PROCEDURE — 82570 ASSAY OF URINE CREATININE: CPT

## 2022-05-24 PROCEDURE — 36415 COLL VENOUS BLD VENIPUNCTURE: CPT

## 2022-05-31 DIAGNOSIS — E11.9 TYPE 2 DIABETES MELLITUS WITHOUT COMPLICATION, WITHOUT LONG-TERM CURRENT USE OF INSULIN (HCC): ICD-10-CM

## 2022-05-31 RX ORDER — CANAGLIFLOZIN 300 MG/1
TABLET, FILM COATED ORAL
Qty: 90 TABLET | Refills: 0 | Status: SHIPPED | OUTPATIENT
Start: 2022-05-31

## 2022-05-31 NOTE — TELEPHONE ENCOUNTER
Last VISIT - 11/2/21Med review, condition review    Last CPE - No recent physical    Last REFILL -     INVOKANA 300 MG Oral Tab 90 tablet 1 12/2/2021      Last LABS - 5/24/22 cbc, cmp, lipid, tsh    Future Appointments   Date Time Provider Marilyn Frank   6/9/2022  1:40 PM aSrah Lyles PA-C [facility]     Per PROTOCOL? FAILED    Please Approve or Deny.

## 2022-06-09 ENCOUNTER — OFFICE VISIT (OUTPATIENT)
Dept: INTERNAL MEDICINE CLINIC | Facility: CLINIC | Age: 77
End: 2022-06-09
Payer: MEDICARE

## 2022-06-09 VITALS
OXYGEN SATURATION: 98 % | DIASTOLIC BLOOD PRESSURE: 60 MMHG | BODY MASS INDEX: 24.9 KG/M2 | WEIGHT: 192 LBS | SYSTOLIC BLOOD PRESSURE: 100 MMHG | HEIGHT: 73.75 IN | HEART RATE: 68 BPM | TEMPERATURE: 98 F

## 2022-06-09 DIAGNOSIS — E04.2 NONTOXIC MULTINODULAR GOITER: ICD-10-CM

## 2022-06-09 DIAGNOSIS — H25.091 AGE-RELATED INCIPIENT CATARACT OF RIGHT EYE: ICD-10-CM

## 2022-06-09 DIAGNOSIS — Z79.01 LONG TERM (CURRENT) USE OF ANTICOAGULANTS: ICD-10-CM

## 2022-06-09 DIAGNOSIS — I10 ESSENTIAL HYPERTENSION: ICD-10-CM

## 2022-06-09 DIAGNOSIS — F17.290 CIGAR SMOKER: ICD-10-CM

## 2022-06-09 DIAGNOSIS — E78.5 DYSLIPIDEMIA: ICD-10-CM

## 2022-06-09 DIAGNOSIS — I48.20 CHRONIC ATRIAL FIBRILLATION (HCC): ICD-10-CM

## 2022-06-09 DIAGNOSIS — E11.40 TYPE 2 DIABETES MELLITUS WITH DIABETIC NEUROPATHY, WITHOUT LONG-TERM CURRENT USE OF INSULIN (HCC): ICD-10-CM

## 2022-06-09 DIAGNOSIS — Z00.00 ROUTINE GENERAL MEDICAL EXAMINATION AT A HEALTH CARE FACILITY: Primary | ICD-10-CM

## 2022-06-09 DIAGNOSIS — D69.6 THROMBOCYTOPENIA (HCC): ICD-10-CM

## 2022-06-09 DIAGNOSIS — I34.0 MODERATE MITRAL REGURGITATION: ICD-10-CM

## 2022-06-09 DIAGNOSIS — Z85.828 HISTORY OF SKIN CANCER: ICD-10-CM

## 2022-06-09 DIAGNOSIS — H91.93 BILATERAL HEARING LOSS, UNSPECIFIED HEARING LOSS TYPE: ICD-10-CM

## 2022-06-09 PROCEDURE — G0439 PPPS, SUBSEQ VISIT: HCPCS | Performed by: PHYSICIAN ASSISTANT

## 2022-06-09 PROCEDURE — 1126F AMNT PAIN NOTED NONE PRSNT: CPT | Performed by: PHYSICIAN ASSISTANT

## 2022-06-09 NOTE — PROGRESS NOTES
Patient is due for yearly foot examination. Advised patient to remove his shoes. Diabetic flow sheet updated.

## 2022-06-26 ENCOUNTER — HOSPITAL ENCOUNTER (EMERGENCY)
Age: 77
End: 2022-06-26

## 2022-07-21 RX ORDER — GLIPIZIDE 10 MG/1
TABLET, FILM COATED, EXTENDED RELEASE ORAL
Qty: 180 TABLET | Refills: 0 | Status: SHIPPED | OUTPATIENT
Start: 2022-07-21

## 2022-07-21 NOTE — TELEPHONE ENCOUNTER
Last VISIT - 6/9/22 Well adult    Last CPE - 6/9/22    Last REFILL -     GLIPIZIDE ER 10 MG Oral Tablet 24 Hr 180 tablet 0 4/30/2022      Last LABS - 5/24/22 cbc, cmp, lipid, tsh    No future appointments. Per PROTOCOL? FAILED    Please Approve or Deny.

## 2022-08-16 DIAGNOSIS — I48.20 CHRONIC ATRIAL FIBRILLATION (HCC): ICD-10-CM

## 2022-08-17 ENCOUNTER — LAB ENCOUNTER (OUTPATIENT)
Dept: LAB | Age: 77
End: 2022-08-17
Attending: INTERNAL MEDICINE
Payer: MEDICARE

## 2022-08-17 DIAGNOSIS — I48.20 CHRONIC ATRIAL FIBRILLATION (HCC): ICD-10-CM

## 2022-08-17 DIAGNOSIS — E11.40 TYPE 2 DIABETES MELLITUS WITH DIABETIC NEUROPATHY, WITHOUT LONG-TERM CURRENT USE OF INSULIN (HCC): ICD-10-CM

## 2022-08-17 LAB
EST. AVERAGE GLUCOSE BLD GHB EST-MCNC: 163 MG/DL (ref 68–126)
HBA1C MFR BLD: 7.3 % (ref ?–5.7)
INR BLD: 1.14 (ref 0.85–1.16)
PROTHROMBIN TIME: 14.5 SECONDS (ref 11.6–14.8)

## 2022-08-17 PROCEDURE — 36415 COLL VENOUS BLD VENIPUNCTURE: CPT

## 2022-08-17 PROCEDURE — 83036 HEMOGLOBIN GLYCOSYLATED A1C: CPT

## 2022-08-17 PROCEDURE — 85610 PROTHROMBIN TIME: CPT

## 2022-08-17 RX ORDER — WARFARIN SODIUM 5 MG/1
TABLET ORAL
Qty: 120 TABLET | Refills: 1 | Status: SHIPPED | OUTPATIENT
Start: 2022-08-17

## 2022-08-17 NOTE — TELEPHONE ENCOUNTER
Last VISIT - 6/9/22 Well adult    Last CPE - 6/9/22    Last REFILL -     WARFARIN 5 MG Oral Tab 120 tablet 1 12/2/2021      Last LABS - 5/24/22 cbc, cmp, lipid, tsh    No future appointments. Per PROTOCOL? None    Please Approve or Deny.

## 2022-08-18 ENCOUNTER — TELEPHONE (OUTPATIENT)
Dept: INTERNAL MEDICINE CLINIC | Facility: CLINIC | Age: 77
End: 2022-08-18

## 2022-08-30 DIAGNOSIS — E11.9 TYPE 2 DIABETES MELLITUS WITHOUT COMPLICATION, WITHOUT LONG-TERM CURRENT USE OF INSULIN (HCC): ICD-10-CM

## 2022-08-31 RX ORDER — CANAGLIFLOZIN 300 MG/1
TABLET, FILM COATED ORAL
Qty: 90 TABLET | Refills: 0 | Status: SHIPPED | OUTPATIENT
Start: 2022-08-31

## 2022-09-20 ENCOUNTER — LAB ENCOUNTER (OUTPATIENT)
Dept: LAB | Age: 77
End: 2022-09-20
Attending: INTERNAL MEDICINE

## 2022-09-20 DIAGNOSIS — I48.20 CHRONIC ATRIAL FIBRILLATION (HCC): ICD-10-CM

## 2022-09-20 LAB
INR BLD: 2.12 (ref 0.85–1.16)
PROTHROMBIN TIME: 23.4 SECONDS (ref 11.6–14.8)

## 2022-09-20 PROCEDURE — 85610 PROTHROMBIN TIME: CPT

## 2022-09-20 PROCEDURE — 36415 COLL VENOUS BLD VENIPUNCTURE: CPT

## 2022-09-26 ENCOUNTER — TELEPHONE (OUTPATIENT)
Dept: INTERNAL MEDICINE CLINIC | Facility: CLINIC | Age: 77
End: 2022-09-26

## 2022-09-26 NOTE — TELEPHONE ENCOUNTER
Received Pathology consult report from [facility]. Put in TB bin for review.

## 2022-09-28 DIAGNOSIS — E11.40 TYPE 2 DIABETES MELLITUS WITH DIABETIC NEUROPATHY, WITHOUT LONG-TERM CURRENT USE OF INSULIN (HCC): ICD-10-CM

## 2022-09-28 RX ORDER — SITAGLIPTIN AND METFORMIN HYDROCHLORIDE 1000; 50 MG/1; MG/1
TABLET, FILM COATED ORAL
Qty: 180 TABLET | Refills: 0 | Status: SHIPPED | OUTPATIENT
Start: 2022-09-28

## 2022-10-25 RX ORDER — GLIPIZIDE 10 MG/1
TABLET, FILM COATED, EXTENDED RELEASE ORAL
Qty: 180 TABLET | Refills: 0 | Status: SHIPPED | OUTPATIENT
Start: 2022-10-25

## 2022-10-25 NOTE — TELEPHONE ENCOUNTER
PASSED per protocol, refill sent.     Last PE:  6-9-2022-CS    Future Appointments   Date Time Provider Marilyn Frank   10/31/2022  6:30 AM [address] [facility] RM 1 [address] [facility]

## 2022-10-31 ENCOUNTER — HOSPITAL ENCOUNTER (OUTPATIENT)
Dept: ULTRASOUND IMAGING | Facility: HOSPITAL | Age: 77
Discharge: HOME | End: 2022-10-31
Attending: PHYSICIAN ASSISTANT
Payer: MEDICARE

## 2022-10-31 DIAGNOSIS — E04.2 NONTOXIC MULTINODULAR GOITER: ICD-10-CM

## 2022-10-31 PROCEDURE — 76536 US EXAM OF HEAD AND NECK: CPT | Performed by: PHYSICIAN ASSISTANT

## 2022-11-17 DIAGNOSIS — E78.5 DYSLIPIDEMIA: ICD-10-CM

## 2022-11-17 RX ORDER — ROSUVASTATIN CALCIUM 20 MG/1
TABLET, COATED ORAL
Qty: 90 TABLET | Refills: 1 | Status: SHIPPED | OUTPATIENT
Start: 2022-11-17

## 2022-11-17 NOTE — TELEPHONE ENCOUNTER
Cholesterol Medication Protocol Passed 11/17/2022 09:38 AM   Protocol Details  ALT < 80    ALT resulted within past year    Lipid panel within past 12 months    Appointment within past 12 or next 3 months      LOV    6/9/22     LAST LAB  5/24/22     LAST RX  5/20/22 90 with 1     Next OV No future appointments.       PROTOCOL pass

## 2022-11-21 ENCOUNTER — TELEPHONE (OUTPATIENT)
Dept: INTERNAL MEDICINE CLINIC | Facility: CLINIC | Age: 77
End: 2022-11-21

## 2022-11-21 DIAGNOSIS — N52.9 ERECTILE DYSFUNCTION, UNSPECIFIED ERECTILE DYSFUNCTION TYPE: ICD-10-CM

## 2022-11-21 NOTE — TELEPHONE ENCOUNTER
Pt called stating his Ins does not cover INVOKANA 300 MG Oral Tab anymore    They Will cover jardiance and farxiga-please send in new rx to [facility]

## 2022-11-22 RX ORDER — SILDENAFIL 50 MG/1
50 TABLET, FILM COATED ORAL
Qty: 10 TABLET | Refills: 3 | Status: SHIPPED | OUTPATIENT
Start: 2022-11-22

## 2022-11-23 NOTE — TELEPHONE ENCOUNTER
Please see below message. Pt insurance does not cover invokana anymore. Alternatives listed in below note.

## 2022-12-12 DIAGNOSIS — I48.20 CHRONIC ATRIAL FIBRILLATION (HCC): ICD-10-CM

## 2022-12-14 ENCOUNTER — LAB ENCOUNTER (OUTPATIENT)
Dept: LAB | Age: 77
End: 2022-12-14
Attending: INTERNAL MEDICINE
Payer: MEDICARE

## 2022-12-14 DIAGNOSIS — E11.40 TYPE 2 DIABETES MELLITUS WITH DIABETIC NEUROPATHY, WITHOUT LONG-TERM CURRENT USE OF INSULIN (HCC): ICD-10-CM

## 2022-12-14 LAB
ALBUMIN SERPL-MCNC: 4 G/DL (ref 3.4–5)
ALBUMIN/GLOB SERPL: 1.2 {RATIO} (ref 1–2)
ALP LIVER SERPL-CCNC: 43 U/L
ALT SERPL-CCNC: 30 U/L
ANION GAP SERPL CALC-SCNC: 3 MMOL/L (ref 0–18)
AST SERPL-CCNC: 17 U/L (ref 15–37)
BILIRUB SERPL-MCNC: 0.4 MG/DL (ref 0.1–2)
BUN BLD-MCNC: 20 MG/DL (ref 7–18)
CALCIUM BLD-MCNC: 9.3 MG/DL (ref 8.5–10.1)
CHLORIDE SERPL-SCNC: 106 MMOL/L (ref 98–112)
CHOLEST SERPL-MCNC: 154 MG/DL (ref ?–200)
CO2 SERPL-SCNC: 30 MMOL/L (ref 21–32)
CREAT BLD-MCNC: 1.13 MG/DL
EST. AVERAGE GLUCOSE BLD GHB EST-MCNC: 169 MG/DL (ref 68–126)
FASTING PATIENT LIPID ANSWER: YES
FASTING STATUS PATIENT QL REPORTED: YES
GFR SERPLBLD BASED ON 1.73 SQ M-ARVRAT: 67 ML/MIN/1.73M2 (ref 60–?)
GLOBULIN PLAS-MCNC: 3.3 G/DL (ref 2.8–4.4)
GLUCOSE BLD-MCNC: 201 MG/DL (ref 70–99)
HBA1C MFR BLD: 7.5 % (ref ?–5.7)
HDLC SERPL-MCNC: 47 MG/DL (ref 40–59)
LDLC SERPL CALC-MCNC: 84 MG/DL (ref ?–100)
NONHDLC SERPL-MCNC: 107 MG/DL (ref ?–130)
OSMOLALITY SERPL CALC.SUM OF ELEC: 296 MOSM/KG (ref 275–295)
POTASSIUM SERPL-SCNC: 4.6 MMOL/L (ref 3.5–5.1)
PROT SERPL-MCNC: 7.3 G/DL (ref 6.4–8.2)
SODIUM SERPL-SCNC: 139 MMOL/L (ref 136–145)
TRIGL SERPL-MCNC: 128 MG/DL (ref 30–149)
VLDLC SERPL CALC-MCNC: 20 MG/DL (ref 0–30)

## 2022-12-14 PROCEDURE — 80061 LIPID PANEL: CPT

## 2022-12-14 PROCEDURE — 83036 HEMOGLOBIN GLYCOSYLATED A1C: CPT

## 2022-12-14 PROCEDURE — 36415 COLL VENOUS BLD VENIPUNCTURE: CPT

## 2022-12-14 PROCEDURE — 80053 COMPREHEN METABOLIC PANEL: CPT

## 2022-12-14 RX ORDER — WARFARIN SODIUM 5 MG/1
TABLET ORAL
Qty: 120 TABLET | Refills: 1 | Status: SHIPPED | OUTPATIENT
Start: 2022-12-14

## 2022-12-14 NOTE — TELEPHONE ENCOUNTER
Pt will be out of warfarin tomorrow, he's asking for refill to be sent to [facility].

## 2022-12-14 NOTE — TELEPHONE ENCOUNTER
Last VISIT - 6/9/22 Well adult    Last CPE - 6/9/22    Last REFILL -     WARFARIN 5 MG Oral Tab 120 tablet 1 8/17/2022     Last LABS - 12/14/22 lipid, cmp    Future Appointments   Date Time Provider Marilyn Frank   12/22/2022 11:40 AM Valentin Burgess MD [facility]     Per PROTOCOL? None    Please Approve or Deny.

## 2022-12-22 ENCOUNTER — OFFICE VISIT (OUTPATIENT)
Dept: INTERNAL MEDICINE CLINIC | Facility: CLINIC | Age: 77
End: 2022-12-22
Payer: MEDICARE

## 2022-12-22 VITALS
HEART RATE: 78 BPM | RESPIRATION RATE: 18 BRPM | WEIGHT: 193.81 LBS | BODY MASS INDEX: 25.14 KG/M2 | OXYGEN SATURATION: 97 % | SYSTOLIC BLOOD PRESSURE: 120 MMHG | HEIGHT: 73.75 IN | DIASTOLIC BLOOD PRESSURE: 62 MMHG

## 2022-12-22 DIAGNOSIS — I48.20 CHRONIC ATRIAL FIBRILLATION (HCC): ICD-10-CM

## 2022-12-22 DIAGNOSIS — E78.5 DYSLIPIDEMIA: ICD-10-CM

## 2022-12-22 DIAGNOSIS — E11.40 TYPE 2 DIABETES MELLITUS WITH DIABETIC NEUROPATHY, WITHOUT LONG-TERM CURRENT USE OF INSULIN (HCC): Primary | ICD-10-CM

## 2022-12-22 DIAGNOSIS — I10 ESSENTIAL HYPERTENSION: ICD-10-CM

## 2022-12-22 PROCEDURE — 99214 OFFICE O/P EST MOD 30 MIN: CPT | Performed by: INTERNAL MEDICINE

## 2023-01-04 ENCOUNTER — TELEPHONE (OUTPATIENT)
Dept: INTERNAL MEDICINE CLINIC | Facility: CLINIC | Age: 78
End: 2023-01-04

## 2023-01-04 NOTE — TELEPHONE ENCOUNTER
Received Pathology consult report from [facility] on 12/20/22 visit for basal cell carcinoma, traumatized benign keratosis and Lentigo. Ph - [phone]. Put in TB bin for review.

## 2023-01-09 PROBLEM — C44.91 BASAL CELL CARCINOMA: Status: ACTIVE | Noted: 2023-01-09

## 2023-01-11 ENCOUNTER — LAB ENCOUNTER (OUTPATIENT)
Dept: LAB | Age: 78
End: 2023-01-11
Attending: INTERNAL MEDICINE
Payer: MEDICARE

## 2023-01-11 DIAGNOSIS — I48.20 CHRONIC ATRIAL FIBRILLATION (HCC): ICD-10-CM

## 2023-01-11 LAB
INR BLD: 2.15 (ref 0.85–1.16)
PROTHROMBIN TIME: 23.8 SECONDS (ref 11.6–14.8)

## 2023-01-11 PROCEDURE — 36415 COLL VENOUS BLD VENIPUNCTURE: CPT

## 2023-01-11 PROCEDURE — 85610 PROTHROMBIN TIME: CPT

## 2023-01-12 ENCOUNTER — TELEPHONE (OUTPATIENT)
Dept: INTERNAL MEDICINE CLINIC | Facility: CLINIC | Age: 78
End: 2023-01-12

## 2023-01-19 ENCOUNTER — TELEPHONE (OUTPATIENT)
Dept: INTERNAL MEDICINE CLINIC | Facility: CLINIC | Age: 78
End: 2023-01-19

## 2023-01-19 NOTE — TELEPHONE ENCOUNTER
Received curettage and desiccation report from [facility]. Put in TB bin for review.

## 2023-01-23 RX ORDER — GLIPIZIDE 10 MG/1
TABLET, FILM COATED, EXTENDED RELEASE ORAL
Qty: 180 TABLET | Refills: 0 | Status: SHIPPED | OUTPATIENT
Start: 2023-01-23

## 2023-01-23 NOTE — TELEPHONE ENCOUNTER
Diabetic Medication Protocol Failed 01/23/2023 10:13 AM   Protocol Details  Last HgBA1C < 7.5    HgBA1C procedure resulted in past 6 months    Microalbumin procedure in past 12 months or taking ACE/ARB    Appointment in past 6 or next 3 months     LOV 12/22/22 tb    LAST LAB  12/14/22     LAST RX  10/25/22 180 tabs     Next OV No future appointments.       PROTOCOL failed

## 2023-03-14 ENCOUNTER — LAB ENCOUNTER (OUTPATIENT)
Dept: LAB | Age: 78
End: 2023-03-14
Attending: INTERNAL MEDICINE
Payer: MEDICARE

## 2023-03-14 DIAGNOSIS — E11.40 TYPE 2 DIABETES MELLITUS WITH DIABETIC NEUROPATHY, WITHOUT LONG-TERM CURRENT USE OF INSULIN (HCC): ICD-10-CM

## 2023-03-14 DIAGNOSIS — I48.20 CHRONIC ATRIAL FIBRILLATION (HCC): ICD-10-CM

## 2023-03-14 DIAGNOSIS — I10 ESSENTIAL HYPERTENSION: ICD-10-CM

## 2023-03-14 LAB
ALBUMIN SERPL-MCNC: 4.1 G/DL (ref 3.4–5)
ALBUMIN/GLOB SERPL: 1.3 {RATIO} (ref 1–2)
ALP LIVER SERPL-CCNC: 48 U/L
ALT SERPL-CCNC: 31 U/L
ANION GAP SERPL CALC-SCNC: 5 MMOL/L (ref 0–18)
AST SERPL-CCNC: 17 U/L (ref 15–37)
BILIRUB SERPL-MCNC: 0.4 MG/DL (ref 0.1–2)
BUN BLD-MCNC: 19 MG/DL (ref 7–18)
CALCIUM BLD-MCNC: 9 MG/DL (ref 8.5–10.1)
CHLORIDE SERPL-SCNC: 106 MMOL/L (ref 98–112)
CO2 SERPL-SCNC: 28 MMOL/L (ref 21–32)
CREAT BLD-MCNC: 1.09 MG/DL
CREAT UR-SCNC: 110 MG/DL
EST. AVERAGE GLUCOSE BLD GHB EST-MCNC: 183 MG/DL (ref 68–126)
FASTING STATUS PATIENT QL REPORTED: YES
GFR SERPLBLD BASED ON 1.73 SQ M-ARVRAT: 69 ML/MIN/1.73M2 (ref 60–?)
GLOBULIN PLAS-MCNC: 3.1 G/DL (ref 2.8–4.4)
GLUCOSE BLD-MCNC: 192 MG/DL (ref 70–99)
HBA1C MFR BLD: 8 % (ref ?–5.7)
INR BLD: 2.57 (ref 0.85–1.16)
MICROALBUMIN UR-MCNC: 1.78 MG/DL
MICROALBUMIN/CREAT 24H UR-RTO: 16.2 UG/MG (ref ?–30)
OSMOLALITY SERPL CALC.SUM OF ELEC: 295 MOSM/KG (ref 275–295)
POTASSIUM SERPL-SCNC: 4.4 MMOL/L (ref 3.5–5.1)
PROT SERPL-MCNC: 7.2 G/DL (ref 6.4–8.2)
PROTHROMBIN TIME: 27.3 SECONDS (ref 11.6–14.8)
SODIUM SERPL-SCNC: 139 MMOL/L (ref 136–145)

## 2023-03-14 PROCEDURE — 36415 COLL VENOUS BLD VENIPUNCTURE: CPT

## 2023-03-14 PROCEDURE — 83036 HEMOGLOBIN GLYCOSYLATED A1C: CPT

## 2023-03-14 PROCEDURE — 80053 COMPREHEN METABOLIC PANEL: CPT

## 2023-03-14 PROCEDURE — 82570 ASSAY OF URINE CREATININE: CPT

## 2023-03-14 PROCEDURE — 85610 PROTHROMBIN TIME: CPT

## 2023-03-14 PROCEDURE — 82043 UR ALBUMIN QUANTITATIVE: CPT

## 2023-03-27 ENCOUNTER — OFFICE VISIT (OUTPATIENT)
Dept: INTERNAL MEDICINE CLINIC | Facility: CLINIC | Age: 78
End: 2023-03-27
Payer: MEDICARE

## 2023-03-27 VITALS
DIASTOLIC BLOOD PRESSURE: 66 MMHG | SYSTOLIC BLOOD PRESSURE: 94 MMHG | HEART RATE: 66 BPM | TEMPERATURE: 98 F | BODY MASS INDEX: 25.42 KG/M2 | WEIGHT: 196 LBS | HEIGHT: 73.75 IN

## 2023-03-27 DIAGNOSIS — D69.6 THROMBOCYTOPENIA (HCC): ICD-10-CM

## 2023-03-27 DIAGNOSIS — I48.20 CHRONIC ATRIAL FIBRILLATION (HCC): ICD-10-CM

## 2023-03-27 DIAGNOSIS — I10 ESSENTIAL HYPERTENSION: ICD-10-CM

## 2023-03-27 DIAGNOSIS — E11.40 TYPE 2 DIABETES MELLITUS WITH DIABETIC NEUROPATHY, WITHOUT LONG-TERM CURRENT USE OF INSULIN (HCC): Primary | ICD-10-CM

## 2023-03-27 PROCEDURE — 99214 OFFICE O/P EST MOD 30 MIN: CPT | Performed by: INTERNAL MEDICINE

## 2023-04-25 DIAGNOSIS — I48.20 CHRONIC ATRIAL FIBRILLATION (HCC): ICD-10-CM

## 2023-04-26 NOTE — TELEPHONE ENCOUNTER
Last VISIT - 3/27/23 f/u of labs    Last CPE - 6/9/22 Well adult    Last REFILL -     GLIPIZIDE ER 10 MG Oral Tablet 24 Hr 180 tablet 0 1/23/2023     WARFARIN 5 MG Oral Tab 120 tablet 1 12/14/2022     Last LABS    No future appointments. Per PROTOCOL? FAILED    Please Approve or Deny.

## 2023-04-27 RX ORDER — WARFARIN SODIUM 5 MG/1
TABLET ORAL
Qty: 120 TABLET | Refills: 0 | Status: SHIPPED | OUTPATIENT
Start: 2023-04-27

## 2023-04-27 RX ORDER — GLIPIZIDE 10 MG/1
TABLET, FILM COATED, EXTENDED RELEASE ORAL
Qty: 180 TABLET | Refills: 1 | Status: SHIPPED | OUTPATIENT
Start: 2023-04-27

## 2023-04-27 NOTE — TELEPHONE ENCOUNTER
Duplicate order    GLIPIZIDE ER 10 MG Oral Tablet 24 Hr 180 tablet 0 1/23/2023     WARFARIN 5 MG Oral Tab 120 tablet 1 12/14/2022

## 2023-05-16 ENCOUNTER — TELEPHONE (OUTPATIENT)
Dept: INTERNAL MEDICINE CLINIC | Facility: CLINIC | Age: 78
End: 2023-05-16

## 2023-05-18 DIAGNOSIS — E78.5 DYSLIPIDEMIA: ICD-10-CM

## 2023-05-19 RX ORDER — ROSUVASTATIN CALCIUM 20 MG/1
TABLET, COATED ORAL
Qty: 90 TABLET | Refills: 1 | Status: SHIPPED | OUTPATIENT
Start: 2023-05-19

## 2023-06-19 DIAGNOSIS — I48.20 CHRONIC ATRIAL FIBRILLATION (HCC): ICD-10-CM

## 2023-06-19 RX ORDER — WARFARIN SODIUM 5 MG/1
TABLET ORAL
Qty: 120 TABLET | Refills: 0 | Status: SHIPPED | OUTPATIENT
Start: 2023-06-19

## 2023-06-20 ENCOUNTER — TELEPHONE (OUTPATIENT)
Dept: INTERNAL MEDICINE CLINIC | Facility: CLINIC | Age: 78
End: 2023-06-20

## 2023-06-20 NOTE — TELEPHONE ENCOUNTER
Orders to    Nilda Gamble         Pt aware to get labs done no sooner than 2 weeks prior to the appt. Pt aware to fast.  No call back required.   Future Appointments   Date Time Provider Marilyn Frank   7/13/2023  8:00 AM Amberly Bolanos.VENITA [facility]

## 2023-07-10 ENCOUNTER — LAB ENCOUNTER (OUTPATIENT)
Dept: LAB | Age: 78
End: 2023-07-10
Attending: INTERNAL MEDICINE
Payer: MEDICARE

## 2023-07-10 DIAGNOSIS — I48.20 CHRONIC ATRIAL FIBRILLATION (HCC): ICD-10-CM

## 2023-07-10 DIAGNOSIS — E11.40 TYPE 2 DIABETES MELLITUS WITH DIABETIC NEUROPATHY, WITHOUT LONG-TERM CURRENT USE OF INSULIN (HCC): ICD-10-CM

## 2023-07-10 DIAGNOSIS — D69.6 THROMBOCYTOPENIA (HCC): ICD-10-CM

## 2023-07-10 LAB
ANION GAP SERPL CALC-SCNC: 4 MMOL/L (ref 0–18)
BASOPHILS # BLD AUTO: 0.04 X10(3) UL (ref 0–0.2)
BASOPHILS NFR BLD AUTO: 0.5 %
BUN BLD-MCNC: 19 MG/DL (ref 7–18)
CALCIUM BLD-MCNC: 9.3 MG/DL (ref 8.5–10.1)
CHLORIDE SERPL-SCNC: 107 MMOL/L (ref 98–112)
CO2 SERPL-SCNC: 26 MMOL/L (ref 21–32)
CREAT BLD-MCNC: 1 MG/DL
CREAT UR-SCNC: 69.2 MG/DL
EOSINOPHIL # BLD AUTO: 0.3 X10(3) UL (ref 0–0.7)
EOSINOPHIL NFR BLD AUTO: 4 %
ERYTHROCYTE [DISTWIDTH] IN BLOOD BY AUTOMATED COUNT: 13 %
EST. AVERAGE GLUCOSE BLD GHB EST-MCNC: 171 MG/DL (ref 68–126)
FASTING STATUS PATIENT QL REPORTED: YES
GFR SERPLBLD BASED ON 1.73 SQ M-ARVRAT: 77 ML/MIN/1.73M2 (ref 60–?)
GLUCOSE BLD-MCNC: 165 MG/DL (ref 70–99)
HBA1C MFR BLD: 7.6 % (ref ?–5.7)
HCT VFR BLD AUTO: 47.5 %
HGB BLD-MCNC: 16.1 G/DL
IMM GRANULOCYTES # BLD AUTO: 0.02 X10(3) UL (ref 0–1)
IMM GRANULOCYTES NFR BLD: 0.3 %
INR BLD: 2.11 (ref 0.85–1.16)
LYMPHOCYTES # BLD AUTO: 1.53 X10(3) UL (ref 1–4)
LYMPHOCYTES NFR BLD AUTO: 20.4 %
MCH RBC QN AUTO: 31.1 PG (ref 26–34)
MCHC RBC AUTO-ENTMCNC: 33.9 G/DL (ref 31–37)
MCV RBC AUTO: 91.7 FL
MICROALBUMIN UR-MCNC: 2 MG/DL
MICROALBUMIN/CREAT 24H UR-RTO: 28.9 UG/MG (ref ?–30)
MONOCYTES # BLD AUTO: 0.7 X10(3) UL (ref 0.1–1)
MONOCYTES NFR BLD AUTO: 9.3 %
NEUTROPHILS # BLD AUTO: 4.9 X10 (3) UL (ref 1.5–7.7)
NEUTROPHILS # BLD AUTO: 4.9 X10(3) UL (ref 1.5–7.7)
NEUTROPHILS NFR BLD AUTO: 65.5 %
OSMOLALITY SERPL CALC.SUM OF ELEC: 290 MOSM/KG (ref 275–295)
PLATELET # BLD AUTO: 153 10(3)UL (ref 150–450)
POTASSIUM SERPL-SCNC: 5.1 MMOL/L (ref 3.5–5.1)
PROTHROMBIN TIME: 23.4 SECONDS (ref 11.6–14.8)
RBC # BLD AUTO: 5.18 X10(6)UL
SODIUM SERPL-SCNC: 137 MMOL/L (ref 136–145)
TSI SER-ACNC: 1.05 MIU/ML (ref 0.36–3.74)
WBC # BLD AUTO: 7.5 X10(3) UL (ref 4–11)

## 2023-07-10 PROCEDURE — 80048 BASIC METABOLIC PNL TOTAL CA: CPT

## 2023-07-10 PROCEDURE — 84443 ASSAY THYROID STIM HORMONE: CPT

## 2023-07-10 PROCEDURE — 82570 ASSAY OF URINE CREATININE: CPT

## 2023-07-10 PROCEDURE — 85610 PROTHROMBIN TIME: CPT

## 2023-07-10 PROCEDURE — 83036 HEMOGLOBIN GLYCOSYLATED A1C: CPT

## 2023-07-10 PROCEDURE — 85025 COMPLETE CBC W/AUTO DIFF WBC: CPT

## 2023-07-10 PROCEDURE — 82043 UR ALBUMIN QUANTITATIVE: CPT

## 2023-07-10 PROCEDURE — 36415 COLL VENOUS BLD VENIPUNCTURE: CPT

## 2023-07-13 ENCOUNTER — OFFICE VISIT (OUTPATIENT)
Dept: INTERNAL MEDICINE CLINIC | Facility: CLINIC | Age: 78
End: 2023-07-13
Payer: MEDICARE

## 2023-07-13 VITALS
OXYGEN SATURATION: 96 % | TEMPERATURE: 97 F | BODY MASS INDEX: 24.23 KG/M2 | DIASTOLIC BLOOD PRESSURE: 62 MMHG | WEIGHT: 188.81 LBS | SYSTOLIC BLOOD PRESSURE: 104 MMHG | RESPIRATION RATE: 16 BRPM | HEART RATE: 74 BPM | HEIGHT: 74 IN

## 2023-07-13 DIAGNOSIS — I10 ESSENTIAL HYPERTENSION: ICD-10-CM

## 2023-07-13 DIAGNOSIS — H25.091 AGE-RELATED INCIPIENT CATARACT OF RIGHT EYE: ICD-10-CM

## 2023-07-13 DIAGNOSIS — Z00.00 ROUTINE GENERAL MEDICAL EXAMINATION AT A HEALTH CARE FACILITY: Primary | ICD-10-CM

## 2023-07-13 DIAGNOSIS — Z79.01 LONG TERM (CURRENT) USE OF ANTICOAGULANTS: ICD-10-CM

## 2023-07-13 DIAGNOSIS — F17.290 CIGAR SMOKER: ICD-10-CM

## 2023-07-13 DIAGNOSIS — E11.40 TYPE 2 DIABETES MELLITUS WITH DIABETIC NEUROPATHY, WITHOUT LONG-TERM CURRENT USE OF INSULIN (HCC): ICD-10-CM

## 2023-07-13 DIAGNOSIS — I34.0 MODERATE MITRAL REGURGITATION: ICD-10-CM

## 2023-07-13 DIAGNOSIS — I48.20 CHRONIC ATRIAL FIBRILLATION (HCC): ICD-10-CM

## 2023-07-13 DIAGNOSIS — E04.2 NONTOXIC MULTINODULAR GOITER: ICD-10-CM

## 2023-07-13 DIAGNOSIS — Z85.828 HISTORY OF SKIN CANCER: ICD-10-CM

## 2023-07-13 DIAGNOSIS — E78.5 DYSLIPIDEMIA: ICD-10-CM

## 2023-07-13 DIAGNOSIS — D69.6 THROMBOCYTOPENIA (HCC): ICD-10-CM

## 2023-07-13 PROBLEM — C44.91 BASAL CELL CARCINOMA: Status: RESOLVED | Noted: 2023-01-09 | Resolved: 2023-07-13

## 2023-07-13 PROCEDURE — 99214 OFFICE O/P EST MOD 30 MIN: CPT | Performed by: PHYSICIAN ASSISTANT

## 2023-07-13 PROCEDURE — 1126F AMNT PAIN NOTED NONE PRSNT: CPT | Performed by: PHYSICIAN ASSISTANT

## 2023-07-13 PROCEDURE — G0439 PPPS, SUBSEQ VISIT: HCPCS | Performed by: PHYSICIAN ASSISTANT

## 2023-08-17 DIAGNOSIS — I48.20 CHRONIC ATRIAL FIBRILLATION (HCC): ICD-10-CM

## 2023-08-17 RX ORDER — WARFARIN SODIUM 5 MG/1
10 TABLET ORAL DAILY
Qty: 120 TABLET | Refills: 0 | Status: SHIPPED | OUTPATIENT
Start: 2023-08-17

## 2023-08-24 DIAGNOSIS — E11.40 TYPE 2 DIABETES MELLITUS WITH DIABETIC NEUROPATHY, WITHOUT LONG-TERM CURRENT USE OF INSULIN (HCC): ICD-10-CM

## 2023-08-24 RX ORDER — SITAGLIPTIN AND METFORMIN HYDROCHLORIDE 1000; 50 MG/1; MG/1
TABLET, FILM COATED ORAL
Qty: 180 TABLET | Refills: 1 | Status: SHIPPED | OUTPATIENT
Start: 2023-08-24

## 2023-08-24 NOTE — TELEPHONE ENCOUNTER
Requested Prescriptions     Pending Prescriptions Disp Refills    JANUMET  MG Oral Tab [Pharmacy Med Name: Janumet 50 mg-1,000 mg tablet] 180 tablet 1     Sig: TAKE ONE TABLET BY MOUTH TWICE DAILY with meals       LOV: 7/13/23    LAST PHYSICAL: 7/13/23    LAST REFILL: janumet-180-1/5/23    LAST LAB: 7/10/23    Your Appointments      Thursday January 18, 2024  8:00 AM  Follow up - Extended with Clif Richey.  Saamria Ellison, [address] [facility], [address] [place] [address] ([facility] IM/ [facility]) [address] [facility]

## 2023-10-18 ENCOUNTER — NURSE ONLY (OUTPATIENT)
Dept: INTERNAL MEDICINE CLINIC | Facility: CLINIC | Age: 78
End: 2023-10-18
Payer: MEDICARE

## 2023-10-18 DIAGNOSIS — Z23 NEED FOR VACCINATION: Primary | ICD-10-CM

## 2023-10-18 PROCEDURE — G0008 ADMIN INFLUENZA VIRUS VAC: HCPCS | Performed by: NURSE PRACTITIONER

## 2023-10-18 PROCEDURE — 90662 IIV NO PRSV INCREASED AG IM: CPT | Performed by: NURSE PRACTITIONER

## 2023-10-23 DIAGNOSIS — I48.20 CHRONIC ATRIAL FIBRILLATION (HCC): ICD-10-CM

## 2023-10-23 DIAGNOSIS — E11.40 TYPE 2 DIABETES MELLITUS WITH DIABETIC NEUROPATHY, WITHOUT LONG-TERM CURRENT USE OF INSULIN (HCC): ICD-10-CM

## 2023-10-24 NOTE — TELEPHONE ENCOUNTER
Diabetic Medication Protocol Mozmyc97/23/2023 10:00 AM   Protocol Details Last HgBA1C < 7.5    HgBA1C procedure resulted in past 6 months    Microalbumin procedure in past 12 months or taking ACE/ARB    Appointment in past 6 or next 3 months       Last annual 7/2023 with CS     Return in about 6 months (around 1/13/2024) for diabetes follow up.     Last INR 7/2023

## 2023-10-25 ENCOUNTER — TELEPHONE (OUTPATIENT)
Dept: INTERNAL MEDICINE CLINIC | Facility: CLINIC | Age: 78
End: 2023-10-25

## 2023-10-25 RX ORDER — WARFARIN SODIUM 5 MG/1
TABLET ORAL
Qty: 120 TABLET | Refills: 0 | Status: SHIPPED | OUTPATIENT
Start: 2023-10-25

## 2023-10-25 RX ORDER — EMPAGLIFLOZIN 25 MG/1
1 TABLET, FILM COATED ORAL DAILY
Qty: 90 TABLET | Refills: 0 | Status: SHIPPED | OUTPATIENT
Start: 2023-10-25 | End: 2024-01-17

## 2023-10-25 RX ORDER — GLIPIZIDE 10 MG/1
TABLET, FILM COATED, EXTENDED RELEASE ORAL
Qty: 180 TABLET | Refills: 0 | Status: SHIPPED | OUTPATIENT
Start: 2023-10-25 | End: 2024-01-17

## 2023-10-25 NOTE — TELEPHONE ENCOUNTER
Last VISIT - 7/13/23 Wellness visit     Last CPE - 7/13/23     Last REFILL -     warfarin 5 MG Oral Tab 120 tablet 0 8/17/2023     Last LABS - 7/10/23 A1c, bmp, cbc, tsh, miroalb/ 3/14/23 cmp 12/14/22 lipid    No future appointments.     Per PROTOCOL? None     Please Approve or Deny.

## 2023-10-25 NOTE — TELEPHONE ENCOUNTER
Pt sees [facility] - coumadin should be managed by [facility]. Last INR here was 7/2023. Has he had a more recent INR through [facility]?

## 2023-10-26 ENCOUNTER — LAB ENCOUNTER (OUTPATIENT)
Dept: LAB | Age: 78
End: 2023-10-26
Attending: INTERNAL MEDICINE

## 2023-10-26 DIAGNOSIS — I48.20 CHRONIC ATRIAL FIBRILLATION (HCC): ICD-10-CM

## 2023-10-26 LAB
INR BLD: 2.16 (ref 0.85–1.16)
PROTHROMBIN TIME: 24.3 SECONDS (ref 11.6–14.8)

## 2023-10-26 PROCEDURE — 36415 COLL VENOUS BLD VENIPUNCTURE: CPT

## 2023-10-26 PROCEDURE — 85610 PROTHROMBIN TIME: CPT

## 2023-10-26 NOTE — TELEPHONE ENCOUNTER
ANNEMARIE    States his INR dose has been the same for 10 years, so he \"is more relaxed about it then he probably should be\". Pt will do INR this AM. He states we manage. Advised of refill and that we'll contact him with results.

## 2023-10-26 NOTE — TELEPHONE ENCOUNTER
Coumadin refill sent, he needs to do INR ASAP. Last time was July and it is suppose to be monthly.   I believe we are still managing coumadin and not cardiology (please confirm)

## 2023-11-13 DIAGNOSIS — E78.5 DYSLIPIDEMIA: ICD-10-CM

## 2023-11-14 RX ORDER — ROSUVASTATIN CALCIUM 20 MG/1
TABLET, COATED ORAL
Qty: 90 TABLET | Refills: 0 | Status: SHIPPED | OUTPATIENT
Start: 2023-11-14

## 2023-11-14 NOTE — TELEPHONE ENCOUNTER
PASSED per protocol, refill sent.     Last PE:  7--cs-    Future Appointments   Date Time Provider Marilyn Frank   1/18/2024  8:00 AM Trini Linton., [facility]

## 2023-12-06 ENCOUNTER — LAB ENCOUNTER (OUTPATIENT)
Dept: LAB | Age: 78
End: 2023-12-06
Attending: INTERNAL MEDICINE
Payer: MEDICARE

## 2023-12-06 DIAGNOSIS — I48.20 CHRONIC ATRIAL FIBRILLATION (HCC): ICD-10-CM

## 2023-12-06 LAB
INR BLD: 2.64 (ref 0.8–1.2)
PROTHROMBIN TIME: 28.5 SECONDS (ref 11.6–14.8)

## 2023-12-06 PROCEDURE — 36415 COLL VENOUS BLD VENIPUNCTURE: CPT

## 2023-12-06 PROCEDURE — 85610 PROTHROMBIN TIME: CPT

## 2023-12-08 DIAGNOSIS — E11.40 TYPE 2 DIABETES MELLITUS WITH DIABETIC NEUROPATHY, WITHOUT LONG-TERM CURRENT USE OF INSULIN (HCC): ICD-10-CM

## 2023-12-08 DIAGNOSIS — N52.9 ERECTILE DYSFUNCTION, UNSPECIFIED ERECTILE DYSFUNCTION TYPE: ICD-10-CM

## 2023-12-08 RX ORDER — SILDENAFIL 50 MG/1
50 TABLET, FILM COATED ORAL
Qty: 10 TABLET | Refills: 3 | Status: SHIPPED | OUTPATIENT
Start: 2023-12-08

## 2023-12-08 RX ORDER — SITAGLIPTIN AND METFORMIN HYDROCHLORIDE 1000; 50 MG/1; MG/1
TABLET, FILM COATED ORAL
Qty: 180 TABLET | Refills: 3 | Status: SHIPPED | OUTPATIENT
Start: 2023-12-08

## 2023-12-08 NOTE — TELEPHONE ENCOUNTER
Last VISIT  - 7/13/23 Wellness visit     Last CPE - 7/13/23     Last REFILL -     SITagliptin-metFORMIN HCl (JANUMET)  MG Oral Tab 180 tablet 1 8/24/2023     SILDENAFIL CITRATE 50 MG Oral Tab 10 tablet 3 11/22/2022     Last LABS -  7/10/23 cbc, tsh 3/14/23 cmp 12/14/22 lipid    No future appointments. Per PROTOCOL? failed     Please Approve or Deny.

## 2023-12-20 ENCOUNTER — TELEPHONE (OUTPATIENT)
Dept: INTERNAL MEDICINE CLINIC | Facility: CLINIC | Age: 78
End: 2023-12-20

## 2023-12-20 NOTE — TELEPHONE ENCOUNTER
PLAN:  Continue metoprolol and diltiazem  Discussed DOAC. He wants to stay on coumadin due to cost.  Continue Crestor  Continue enalapril  Continue DM management per PCP        Would you like visit to discuss concerns or would you like to provide input on eliquis as mentioned to patient by cards?

## 2023-12-20 NOTE — TELEPHONE ENCOUNTER
Patient has been on Warfarin for years.  Patient had to change Cardiologist's and is now seeing Dr. Jose Angel Deluna.    Dr. Deluna is really pushing Eliquis and patient wants an opinion from TB/CS on this.

## 2023-12-21 NOTE — TELEPHONE ENCOUNTER
ANNEMARIE    Pt aware. He asked us to send Rx- advised to call Cards and that typically they would Rx if they are the ones recommending. He's agreeable and will call back if any issues.

## 2023-12-29 ENCOUNTER — LAB ENCOUNTER (OUTPATIENT)
Dept: LAB | Age: 78
End: 2023-12-29
Attending: INTERNAL MEDICINE
Payer: MEDICARE

## 2023-12-29 DIAGNOSIS — I48.20 CHRONIC ATRIAL FIBRILLATION (HCC): ICD-10-CM

## 2023-12-29 LAB
INR BLD: 1.18 (ref 0.8–1.2)
PROTHROMBIN TIME: 15 SECONDS (ref 11.6–14.8)

## 2023-12-29 PROCEDURE — 36415 COLL VENOUS BLD VENIPUNCTURE: CPT

## 2023-12-29 PROCEDURE — 85610 PROTHROMBIN TIME: CPT

## 2024-01-15 DIAGNOSIS — E11.40 TYPE 2 DIABETES MELLITUS WITH DIABETIC NEUROPATHY, WITHOUT LONG-TERM CURRENT USE OF INSULIN (HCC): ICD-10-CM

## 2024-01-16 NOTE — TELEPHONE ENCOUNTER
Last VISIT 07-13-23 CS physical    Last CPE 07-13-23    Last REFILL   Jardiance 25 MG 10-25-23   Glipizide 10 MG  10-25-23    Last LABS 07-10-23 cbc, tsh    Future Appointments   Date Time Provider Department Center   1/18/2024  8:00 AM Latia Newton PA-C [facility]         Per PROTOCOL? No    Please Approve or Deny.

## 2024-01-17 RX ORDER — EMPAGLIFLOZIN 25 MG/1
1 TABLET, FILM COATED ORAL DAILY
Qty: 90 TABLET | Refills: 0 | Status: SHIPPED | OUTPATIENT
Start: 2024-01-17

## 2024-01-17 RX ORDER — GLIPIZIDE 10 MG/1
TABLET, FILM COATED, EXTENDED RELEASE ORAL
Qty: 180 TABLET | Refills: 0 | Status: SHIPPED | OUTPATIENT
Start: 2024-01-17

## 2024-01-18 ENCOUNTER — OFFICE VISIT (OUTPATIENT)
Dept: INTERNAL MEDICINE CLINIC | Facility: CLINIC | Age: 79
End: 2024-01-18
Payer: MEDICARE

## 2024-01-18 VITALS
TEMPERATURE: 97 F | HEIGHT: 74 IN | SYSTOLIC BLOOD PRESSURE: 106 MMHG | WEIGHT: 195.19 LBS | OXYGEN SATURATION: 96 % | DIASTOLIC BLOOD PRESSURE: 62 MMHG | HEART RATE: 74 BPM | BODY MASS INDEX: 25.05 KG/M2 | RESPIRATION RATE: 18 BRPM

## 2024-01-18 DIAGNOSIS — F17.290 CIGAR SMOKER: ICD-10-CM

## 2024-01-18 DIAGNOSIS — E11.40 TYPE 2 DIABETES MELLITUS WITH DIABETIC NEUROPATHY, WITHOUT LONG-TERM CURRENT USE OF INSULIN (HCC): Primary | ICD-10-CM

## 2024-01-18 DIAGNOSIS — I48.0 PAROXYSMAL ATRIAL FIBRILLATION (HCC): ICD-10-CM

## 2024-01-18 DIAGNOSIS — I10 ESSENTIAL HYPERTENSION: ICD-10-CM

## 2024-01-18 DIAGNOSIS — D69.6 THROMBOCYTOPENIA (HCC): ICD-10-CM

## 2024-01-18 DIAGNOSIS — Z85.828 HISTORY OF SKIN CANCER: ICD-10-CM

## 2024-01-18 LAB
CARTRIDGE LOT#: 654 NUMERIC
HEMOGLOBIN A1C: 8.1 % (ref 4.3–5.6)

## 2024-01-18 PROCEDURE — 99214 OFFICE O/P EST MOD 30 MIN: CPT | Performed by: PHYSICIAN ASSISTANT

## 2024-01-18 PROCEDURE — 83036 HEMOGLOBIN GLYCOSYLATED A1C: CPT | Performed by: PHYSICIAN ASSISTANT

## 2024-01-18 RX ORDER — CALCIPOTRIENE 50 UG/G
OINTMENT TOPICAL
COMMUNITY
Start: 2023-11-10

## 2024-01-18 RX ORDER — FLUOROURACIL 50 MG/G
CREAM TOPICAL
COMMUNITY
Start: 2023-10-26

## 2024-01-18 NOTE — PROGRESS NOTES
Jose Angel Rodriguez is a 78 year old male.   Chief Complaint   Patient presents with    Diabetes     EJ RM 13- Pt is here for dm f/u     HPI:    Pt presents for f/u.     DM.   Currently taking jardiance 25 mg daily, glipizide ER 20 mg daily, and janumet  mg bid.   A1c today 8.1% - this is increased from 7.6%.   He notes he has been less active during the winter months but will be in [place] x 1 month in February and will be more active then.         Allergies:  No Known Allergies   Current Meds:  Current Outpatient Medications   Medication Sig Dispense Refill    apixaban 5 MG Oral Tab Take 1 tablet (5 mg total) by mouth 2 (two) times daily.      Calcipotriene 0.005 % External Ointment Apply topically.      fluorouracil 5 % External Cream Apply topically.      GLIPIZIDE ER 10 MG Oral Tablet 24 Hr TAKE TWO TABLETS BY MOUTH EVERY MORNING 180 tablet 0    JARDIANCE 25 MG Oral Tab TAKE ONE TABLET BY MOUTH DAILY 90 tablet 0    Sildenafil Citrate 50 MG Oral Tab Take 1 tablet (50 mg total) by mouth daily as needed for Erectile Dysfunction. 10 tablet 3    SITagliptin-metFORMIN HCl (JANUMET)  MG Oral Tab TAKE ONE TABLET BY MOUTH TWICE DAILY with meals 180 tablet 3    rosuvastatin 20 MG Oral Tab TAKE ONE TABLET BY MOUTH DAILY 90 tablet 0    DILTIAZEM HCL ER COATED BEADS 180 MG Oral Capsule SR 24 Hr TAKE 1 CAPSULE (180 MG TOTAL) BY MOUTH ONCE DAILY. 90 capsule 1    ENALAPRIL 2.5 MG Oral Tab TAKE 1 TABLET (2.5 MG TOTAL) BY MOUTH DAILY. 90 tablet 1    METOPROLOL SUCCINATE 100 MG Oral Tablet 24 Hr TAKE 1/2 TABLET BY MOUTH DAILY 45 tablet 1    Clobetasol Propionate (TEMOVATE) 0.05 % Apply Externally Ointment Apply 1 Application topically as needed.  0    Cholecalciferol (VITAMIN D) 1000 UNITS Oral Tab Take 2 tablets by mouth daily.      MULTIVITAMIN OR 1 tab po daily      VITAMIN C OR 500mg po daily      GLUCOSAMINE CHONDROITIN ADV OR 1 tab po daily      WARFARIN 5 MG Oral Tab TAKE TWO TABLETS (10 MG) BY MOUTH DAILY FIVE times  PER WEEK (Patient not taking: Reported on 1/18/2024) 120 tablet 0        PMH:     Past Medical History:   Diagnosis Date    Atrial fibrillation (HCC)     Basal cell carcinoma 01/09/2023    Basal cell carcinoma of chin 08/17/2015    Cellulitis of right lower extremity 06/2015    Diabetes mellitus (HCC)     Diabetic foot ulcer (HCC) 06/16/2015    Goiter     Heart palpitations     History of skin cancer 06/09/2022    Moderate mitral regurgitation     Skin cancer     Stopped smoking with greater than 40 pack year history 01/14/2013       ROS:   GENERAL: Negative for fever, chills and fatigue. NAD.  HENT: Negative for congestion, sore throat, and ear pain.  RESPIRATORY: Negative for cough, chest tightness, shortness of breath and wheezing.    CV: Negative for chest pain, palpitations and leg swelling.   GI: Negative for nausea, vomiting, abdominal pain, diarrhea, and blood in stool.   PSYCH: The patient is not nervous/anxious. No depression.      PHYSICAL EXAM:    /62   Pulse 74   Temp 97 °F (36.1 °C) (Temporal)   Resp 18   Ht 6' 2\" (1.88 m)   Wt 195 lb 3.2 oz (88.5 kg)   SpO2 96%   BMI 25.06 kg/m²     GENERAL: NAD. A&Ox3  RESPIRATORY: CTAB, no R/R/W  CV: RRR, no murmurs.   LE: Bilateral barefoot skin diabetic exam is abnormal with diabetic monofilament/sensation testing abnormal and dystrophic nails and/or dry skin  PSYCH: Appropriate mood and affect.      ASSESSMENT/ PLAN:   1. Type 2 diabetes mellitus with diabetic neuropathy, without long-term current use of insulin (HCC)  A1c increased today   Continue current meds   Increase exercise and watch diet more closely   Check fasting labs  Reviewed foot care to monitor for skin breakdown and signs of infection   F/u in 3 months   - POC Hgb A1C  - Comp Metabolic Panel (14) [E]; Future  - Lipid Panel [E]; Future    2. Paroxysmal atrial fibrillation (HCC)  Per cardiology - now on eliquis instead of coumadin and doing well     3. Thrombocytopenia (HCC)  Check  labs   - CBC W Differential W Platelet [E]; Future    4. Essential hypertension  Stable   CPM    5. Cigar smoker  Not ready to quit     6. History of skin cancer  Sees derm regularly        Health Maintenance Due   Topic Date Due    Zoster Vaccines (3 of 3) 01/23/2023    Tobacco Cessation Counseling 1 Year  06/09/2023    COVID-19 Vaccine (4 - 2023-24 season) 09/01/2023    Fall Risk Screening (Annual)  01/01/2024    Diabetes Care Foot Exam (Annual)  01/01/2024    Diabetes Care A1C  01/10/2024           Pt indicates understanding and agrees to the plan.     Return in about 3 months (around 4/18/2024) for diabetes follow up.    Latia Newton PA-C

## 2024-01-18 NOTE — PATIENT INSTRUCTIONS
Complete fasting labs   Schedule eye exam for 5/2024  See me in 3 months for diabetes follow up   Complete 2nd shingrix vaccine

## 2024-01-22 ENCOUNTER — LAB ENCOUNTER (OUTPATIENT)
Dept: LAB | Age: 79
End: 2024-01-22
Attending: PHYSICIAN ASSISTANT
Payer: MEDICARE

## 2024-01-22 DIAGNOSIS — D69.6 THROMBOCYTOPENIA (HCC): ICD-10-CM

## 2024-01-22 DIAGNOSIS — E11.40 TYPE 2 DIABETES MELLITUS WITH DIABETIC NEUROPATHY, WITHOUT LONG-TERM CURRENT USE OF INSULIN (HCC): ICD-10-CM

## 2024-01-22 LAB
ALBUMIN SERPL-MCNC: 4.3 G/DL (ref 3.4–5)
ALBUMIN/GLOB SERPL: 1.3 {RATIO} (ref 1–2)
ALP LIVER SERPL-CCNC: 44 U/L
ANION GAP SERPL CALC-SCNC: 4 MMOL/L (ref 0–18)
AST SERPL-CCNC: 17 U/L (ref 15–37)
BASOPHILS # BLD AUTO: 0.06 X10(3) UL (ref 0–0.2)
BASOPHILS NFR BLD AUTO: 0.8 %
BILIRUB SERPL-MCNC: 0.6 MG/DL (ref 0.1–2)
BUN BLD-MCNC: 21 MG/DL (ref 9–23)
CALCIUM BLD-MCNC: 9.4 MG/DL (ref 8.5–10.1)
CHLORIDE SERPL-SCNC: 107 MMOL/L (ref 98–112)
CHOLEST SERPL-MCNC: 146 MG/DL (ref ?–200)
CO2 SERPL-SCNC: 28 MMOL/L (ref 21–32)
CREAT BLD-MCNC: 1.27 MG/DL
EGFRCR SERPLBLD CKD-EPI 2021: 57 ML/MIN/1.73M2 (ref 60–?)
EOSINOPHIL # BLD AUTO: 0.3 X10(3) UL (ref 0–0.7)
EOSINOPHIL NFR BLD AUTO: 3.8 %
ERYTHROCYTE [DISTWIDTH] IN BLOOD BY AUTOMATED COUNT: 12.8 %
FASTING PATIENT LIPID ANSWER: YES
FASTING STATUS PATIENT QL REPORTED: YES
GLOBULIN PLAS-MCNC: 3.3 G/DL (ref 2.8–4.4)
GLUCOSE BLD-MCNC: 197 MG/DL (ref 70–99)
HCT VFR BLD AUTO: 49.8 %
HDLC SERPL-MCNC: 49 MG/DL (ref 40–59)
HGB BLD-MCNC: 16.8 G/DL
IMM GRANULOCYTES # BLD AUTO: 0.02 X10(3) UL (ref 0–1)
IMM GRANULOCYTES NFR BLD: 0.3 %
LDLC SERPL CALC-MCNC: 74 MG/DL (ref ?–100)
LYMPHOCYTES # BLD AUTO: 2.12 X10(3) UL (ref 1–4)
LYMPHOCYTES NFR BLD AUTO: 26.7 %
MCH RBC QN AUTO: 31.3 PG (ref 26–34)
MCHC RBC AUTO-ENTMCNC: 33.7 G/DL (ref 31–37)
MCV RBC AUTO: 92.7 FL
MONOCYTES # BLD AUTO: 0.75 X10(3) UL (ref 0.1–1)
MONOCYTES NFR BLD AUTO: 9.5 %
NEUTROPHILS # BLD AUTO: 4.68 X10 (3) UL (ref 1.5–7.7)
NEUTROPHILS # BLD AUTO: 4.68 X10(3) UL (ref 1.5–7.7)
NEUTROPHILS NFR BLD AUTO: 58.9 %
NONHDLC SERPL-MCNC: 97 MG/DL (ref ?–130)
OSMOLALITY SERPL CALC.SUM OF ELEC: 296 MOSM/KG (ref 275–295)
PLATELET # BLD AUTO: 165 10(3)UL (ref 150–450)
POTASSIUM SERPL-SCNC: 4.5 MMOL/L (ref 3.5–5.1)
PROT SERPL-MCNC: 7.6 G/DL (ref 6.4–8.2)
RBC # BLD AUTO: 5.37 X10(6)UL
SODIUM SERPL-SCNC: 139 MMOL/L (ref 136–145)
TRIGL SERPL-MCNC: 128 MG/DL (ref 30–149)
VLDLC SERPL CALC-MCNC: 20 MG/DL (ref 0–30)
WBC # BLD AUTO: 7.9 X10(3) UL (ref 4–11)

## 2024-01-22 PROCEDURE — 80061 LIPID PANEL: CPT

## 2024-01-22 PROCEDURE — 85025 COMPLETE CBC W/AUTO DIFF WBC: CPT

## 2024-01-22 PROCEDURE — 80053 COMPREHEN METABOLIC PANEL: CPT

## 2024-01-22 PROCEDURE — 36415 COLL VENOUS BLD VENIPUNCTURE: CPT

## 2024-01-24 DIAGNOSIS — E78.5 DYSLIPIDEMIA: ICD-10-CM

## 2024-01-24 RX ORDER — ROSUVASTATIN CALCIUM 20 MG/1
TABLET, COATED ORAL
Qty: 90 TABLET | Refills: 3 | Status: SHIPPED | OUTPATIENT
Start: 2024-01-24

## 2024-01-24 NOTE — TELEPHONE ENCOUNTER
Last VISIT 01-18-24 CS DM    Last CPE 07-13-23     Last REFILL 11-14-23    Last LABS 01-22-24 cbc, lipid, a1c    No future appointments.      Per PROTOCOL? No    Please Approve or Deny.

## 2024-04-23 DIAGNOSIS — E11.40 TYPE 2 DIABETES MELLITUS WITH DIABETIC NEUROPATHY, WITHOUT LONG-TERM CURRENT USE OF INSULIN (HCC): ICD-10-CM

## 2024-04-23 NOTE — TELEPHONE ENCOUNTER
Name from pharmacy: glipizide ER 10 mg tablet, extended release 24 hr          Will file in chart as: GLIPIZIDE ER 10 MG Oral Tablet 24 Hr    Sig: TAKE TWO TABLETS BY MOUTH EVERY MORNING    Disp: 180 tablet    Refills: 0    Start: 4/23/2024    Class: Normal    Non-formulary    Last ordered: 3 months ago (1/17/2024) by Latia Newton PA-C    Last refill: 1/17/2024    Rx #: [id number]    Diabetes Medication Protocol [id number]/23/2024 09:00 AM   Protocol Details Last A1C < 7.5 and within past 6 months    In person appointment or virtual visit in the past 6 mos or appointment in next 3 mos    Microalbumin procedure in past 12 months or taking ACE/ARB    EGFRCR or GFRNAA > 50    GFR in the past 12 months       Name from pharmacy: Jardiance 25 mg tablet         Will file in chart as: JARDIANCE 25 MG Oral Tab    Sig: TAKE ONE TABLET BY MOUTH DAILY    Disp: 90 tablet    Refills: 0    Start: 4/23/2024    Class: Normal    Non-formulary For: Type 2 diabetes mellitus with diabetic neuropathy, without long-term current use of insulin (HCC)    Last ordered: 3 months ago (1/17/2024) by Latia Newton PA-C    Last refill: 1/17/2024    Rx #: [id number]    Diabetes Medication Protocol [id number]/23/2024 09:00 AM   Protocol Details Last A1C < 7.5 and within past 6 months    In person appointment or virtual visit in the past 6 mos or appointment in next 3 mos    Microalbumin procedure in past 12 months or taking ACE/ARB    EGFRCR or GFRNAA > 50    GFR in the past 12 months          LOV 1/18/24   Last A1C from 1/18/24 was 8.1%   90 day supply sent on 1/17/24 for both meds.   Protocol failed routing to

## 2024-04-24 RX ORDER — EMPAGLIFLOZIN 25 MG/1
1 TABLET, FILM COATED ORAL DAILY
Qty: 90 TABLET | Refills: 0 | Status: SHIPPED | OUTPATIENT
Start: 2024-04-24

## 2024-04-24 RX ORDER — GLIPIZIDE 10 MG/1
TABLET, FILM COATED, EXTENDED RELEASE ORAL
Qty: 180 TABLET | Refills: 0 | Status: SHIPPED | OUTPATIENT
Start: 2024-04-24

## 2024-05-20 ENCOUNTER — MED REC SCAN ONLY (OUTPATIENT)
Dept: INTERNAL MEDICINE CLINIC | Facility: CLINIC | Age: 79
End: 2024-05-20

## 2024-06-03 ENCOUNTER — NURSE TRIAGE (OUTPATIENT)
Dept: INTERNAL MEDICINE CLINIC | Facility: CLINIC | Age: 79
End: 2024-06-03

## 2024-06-03 NOTE — TELEPHONE ENCOUNTER
Action Requested: Summary for Provider     []  Critical Lab, Recommendations Needed  [] Need Additional Advice  []   FYI    []   Need Orders  [] Need Medications Sent to Pharmacy  []  Other     SUMMARY: Patient reports left leg pain x 2 weeks. Pain starts at hip and radiates to buttock and down side of leg.  Denies redness or swelling. Patient requests appointment.       Reason for call: Leg Pain  Onset: 2 weeks    Pain started with patient was mowing lawn, doing yard work, bending over. Patient taking tylenol Q 4 hours for pain which helps.  No trouble walking, able to do normal activities including golf, pain increases with sitting and laying.     Reason for Disposition   Patient wants to be seen    Protocols used: Leg Pain-A-OH

## 2024-06-04 ENCOUNTER — OFFICE VISIT (OUTPATIENT)
Dept: INTERNAL MEDICINE CLINIC | Facility: CLINIC | Age: 79
End: 2024-06-04
Payer: MEDICARE

## 2024-06-04 VITALS
TEMPERATURE: 97 F | RESPIRATION RATE: 16 BRPM | OXYGEN SATURATION: 98 % | BODY MASS INDEX: 23.74 KG/M2 | HEIGHT: 74 IN | HEART RATE: 70 BPM | SYSTOLIC BLOOD PRESSURE: 104 MMHG | WEIGHT: 185 LBS | DIASTOLIC BLOOD PRESSURE: 62 MMHG

## 2024-06-04 DIAGNOSIS — I48.91 ATRIAL FIBRILLATION, UNSPECIFIED TYPE (HCC): ICD-10-CM

## 2024-06-04 DIAGNOSIS — M54.32 LEFT SIDED SCIATICA: Primary | ICD-10-CM

## 2024-06-04 DIAGNOSIS — E11.40 TYPE 2 DIABETES MELLITUS WITH DIABETIC NEUROPATHY, WITHOUT LONG-TERM CURRENT USE OF INSULIN (HCC): ICD-10-CM

## 2024-06-04 PROCEDURE — 99213 OFFICE O/P EST LOW 20 MIN: CPT | Performed by: INTERNAL MEDICINE

## 2024-06-04 RX ORDER — METHYLPREDNISOLONE 4 MG/1
TABLET ORAL
Qty: 1 EACH | Refills: 0 | Status: SHIPPED | OUTPATIENT
Start: 2024-06-04

## 2024-06-04 RX ORDER — DILTIAZEM HYDROCHLORIDE 120 MG/1
120 CAPSULE, COATED, EXTENDED RELEASE ORAL DAILY
COMMUNITY
Start: 2024-05-24

## 2024-06-04 NOTE — PROGRESS NOTES
Jose Angel Rodriguez is a 79 year old male.    Chief Complaint   Patient presents with    Back Pain     Rm 3       HPI:     Patient A fib, DM2, HTN c/o left leg shooting pain and sometimes numbness for 1 month.   Symptoms started 1-2 days after he mowed the overgrown grass and did a lot of bending and lifting.  No significant back pain. Pain in left leg starts up high by buttocks and radiates down to mid calf.  No acute bladder or bowel issues. No falls. He is walking normally. Pain usually if sitting for a while.  He tried tylenol but did not relieve his symptoms.       Patient Active Problem List   Diagnosis    Nontoxic multinodular goiter    Atrial fibrillation (HCC)    Moderate mitral regurgitation    Essential hypertension    Long term (current) use of anticoagulants    Type 2 diabetes mellitus with diabetic neuropathy, without long-term current use of insulin (HCC)    Cataract, right eye    Cigar smoker    Dyslipidemia    Thrombocytopenia (HCC)    History of skin cancer     Current Outpatient Medications   Medication Sig Dispense Refill    dilTIAZem  MG Oral Capsule SR 24 Hr Take 1 capsule (120 mg total) by mouth daily.      methylPREDNISolone (MEDROL) 4 MG Oral Tablet Therapy Pack As directed. 1 each 0    GLIPIZIDE ER 10 MG Oral Tablet 24 Hr TAKE TWO TABLETS BY MOUTH EVERY MORNING 180 tablet 0    JARDIANCE 25 MG Oral Tab TAKE ONE TABLET BY MOUTH DAILY 90 tablet 0    rosuvastatin 20 MG Oral Tab TAKE ONE TABLET BY MOUTH DAILY 90 tablet 3    apixaban 5 MG Oral Tab Take 1 tablet (5 mg total) by mouth 2 (two) times daily.      Calcipotriene 0.005 % External Ointment Apply topically.      fluorouracil 5 % External Cream Apply topically.      Sildenafil Citrate 50 MG Oral Tab Take 1 tablet (50 mg total) by mouth daily as needed for Erectile Dysfunction. 10 tablet 3    SITagliptin-metFORMIN HCl (JANUMET)  MG Oral Tab TAKE ONE TABLET BY MOUTH TWICE DAILY with meals 180 tablet 3    ENALAPRIL 2.5 MG Oral Tab TAKE  1 TABLET (2.5 MG TOTAL) BY MOUTH DAILY. 90 tablet 1    METOPROLOL SUCCINATE 100 MG Oral Tablet 24 Hr TAKE 1/2 TABLET BY MOUTH DAILY 45 tablet 1    Clobetasol Propionate (TEMOVATE) 0.05 % Apply Externally Ointment Apply 1 Application topically as needed.  0    Cholecalciferol (VITAMIN D) 1000 UNITS Oral Tab Take 2 tablets by mouth daily.      MULTIVITAMIN OR 1 tab po daily      VITAMIN C OR 500mg po daily      GLUCOSAMINE CHONDROITIN ADV OR 1 tab po daily      WARFARIN 5 MG Oral Tab TAKE TWO TABLETS (10 MG) BY MOUTH DAILY FIVE times PER WEEK (Patient not taking: Reported on 2024) 120 tablet 0    DILTIAZEM HCL ER COATED BEADS 180 MG Oral Capsule SR 24 Hr TAKE 1 CAPSULE (180 MG TOTAL) BY MOUTH ONCE DAILY. (Patient not taking: Reported on 2024) 90 capsule 1      Past Medical History:    Atrial fibrillation (HCC)    Basal cell carcinoma    Basal cell carcinoma of chin    Cellulitis of right lower extremity    Diabetes mellitus (HCC)    Diabetic foot ulcer (HCC)    Goiter    Heart palpitations    History of skin cancer    Moderate mitral regurgitation    Skin cancer    Stopped smoking with greater than 40 pack year history      Social History:  Social History     Socioeconomic History    Marital status:    Occupational History    Occupation: Retired railroad executive, consultant   Tobacco Use    Smoking status: Some Days     Types: Cigars    Smokeless tobacco: Never    Tobacco comments:     2 cigars/week   Vaping Use    Vaping status: Never Used   Substance and Sexual Activity    Alcohol use: Yes     Alcohol/week: 4.0 - 5.0 standard drinks of alcohol     Types: 4 - 5 Standard drinks or equivalent per week     Comment: Cage done 19    Drug use: No    Sexual activity: Yes   Other Topics Concern    Caffeine Concern Yes    Exercise Yes     Family History   Problem Relation Age of Onset    Other (ALzheimer's Disease) Mother 52         at 69    Other (Liver Cancer) Paternal Grandfather     Other (Lung  Cancer) Father          at 80    Other (stroke sydrome) Maternal Grandfather     Other (Cirrhosis) Maternal Aunt          at 69    Diabetes Brother         Allergies  No Known Allergies      REVIEW OF SYSTEMS:   GENERAL HEALTH:  no fevers   RESPIRATORY: no cough  CARDIOVASCULAR: denies chest pain  GI: denies abdominal pain  MS: left leg pain      EXAM:   /62 (BP Location: Left arm, Patient Position: Sitting, Cuff Size: adult)   Pulse 70   Temp 97.2 °F (36.2 °C) (Skin)   Resp 16   Ht 6' 2\" (1.88 m)   Wt 185 lb (83.9 kg)   SpO2 98%   BMI 23.75 kg/m²   GENERAL: well developed, well nourished,in no apparent distress  LUNGS: normal rate without respiratory distress, lungs clear to auscultation  CARDIO: irreg irreg nl S1 S2  GI: normal bowel sounds, soft, NT/ND  Spine: no lumbar spine TTP, +SLR on left  EXTREMITIES: no edema  NEURO: Alert and oriented, no focal deficits    ASSESSMENT AND PLAN:     Encounter Diagnoses   Name     Left sided sciatica- Medrol dose pack, avoid lifting anything heavy.  If symptoms persist, consider lumbar spine xray and PT     Type 2 diabetes mellitus with diabetic neuropathy, without long-term current use of insulin (HCC)- due for dm labs prior to wellness, orders placed     Atrial fibrillation, unspecified type (HCC)- rate controlled and on anticoagulation, will avoid nsaids with apixaban        Orders Placed This Encounter   Procedures    Hemoglobin A1C [E]    Basic Metabolic Panel (8) [E]    Microalb/Creat Ratio, Random Urine       Meds & Refills for this Visit:  Requested Prescriptions     Signed Prescriptions Disp Refills    methylPREDNISolone (MEDROL) 4 MG Oral Tablet Therapy Pack 1 each 0     Sig: As directed.       Imaging & Consults:  None    Return in about 6 weeks (around 2024), or if symptoms worsen or fail to improve, for annual.  There are no Patient Instructions on file for this visit.      The patient indicates understanding of these issues and agrees  to the plan.

## 2024-06-07 ENCOUNTER — TELEPHONE (OUTPATIENT)
Dept: INTERNAL MEDICINE CLINIC | Facility: CLINIC | Age: 79
End: 2024-06-07

## 2024-06-07 NOTE — TELEPHONE ENCOUNTER
Received Surgical Pathology report from [facility] on 5/30/24.  Put in TB bin for review.

## 2024-06-11 ENCOUNTER — HOSPITAL ENCOUNTER (OUTPATIENT)
Dept: GENERAL RADIOLOGY | Age: 79
Discharge: HOME | End: 2024-06-11
Attending: INTERNAL MEDICINE
Payer: MEDICARE

## 2024-06-11 ENCOUNTER — TELEPHONE (OUTPATIENT)
Dept: INTERNAL MEDICINE CLINIC | Facility: CLINIC | Age: 79
End: 2024-06-11

## 2024-06-11 DIAGNOSIS — M51.36 DDD (DEGENERATIVE DISC DISEASE), LUMBAR: Primary | ICD-10-CM

## 2024-06-11 DIAGNOSIS — M54.30 SCIATICA, UNSPECIFIED LATERALITY: ICD-10-CM

## 2024-06-11 DIAGNOSIS — M54.30 SCIATICA, UNSPECIFIED LATERALITY: Primary | ICD-10-CM

## 2024-06-11 PROCEDURE — 72110 X-RAY EXAM L-2 SPINE 4/>VWS: CPT | Performed by: INTERNAL MEDICINE

## 2024-06-11 RX ORDER — TRAMADOL HYDROCHLORIDE 50 MG/1
50 TABLET ORAL 2 TIMES DAILY PRN
Qty: 30 TABLET | Refills: 0 | Status: SHIPPED | OUTPATIENT
Start: 2024-06-11

## 2024-06-11 NOTE — TELEPHONE ENCOUNTER
Called and spoke to pt. Advised to complete lumbar XR, PT likely needed but check XR first. Also informed him tramadol has been sent, warmed of sedation and advised not to drive if taking. Also informed him he can take tylenol 500 mg TID PRN along with tramadol. Pt stated understanding and agreed to plan, will go get XR completed today.

## 2024-06-11 NOTE — TELEPHONE ENCOUNTER
Check lumbar xray, I will send in tramadol prn for pain.   He will likely need to do PT but I first want him to check xray that I am ordering

## 2024-06-11 NOTE — TELEPHONE ENCOUNTER
Please let him know to NOT drive after tramadol as it can make him sleepy. He can also take tylenol 500mg TID prn pain along with tramadol

## 2024-06-11 NOTE — TELEPHONE ENCOUNTER
Call transferred to triage. Per pt, is sciatic pain improved while on medrol pack, finished pack on Sunday. He was feeling ok on Sunday and Monday, but today he cannot walk. Stated pain is worse when when he saw TB last week, sciatic pain down L groin/leg.     Routing to TB to update/recs on next steps.

## 2024-06-11 NOTE — TELEPHONE ENCOUNTER
Can't take a step of more than 6inches before pain comes back. Patient is unable to walk.     Patient stated he took the below medication and was better but now pain is back and he's out.    methylPREDNISolone (MEDROL) 4 MG Oral Tablet Therapy Pack 1 each 0 6/4/2024 --   Sig:   As directed.

## 2024-06-12 ENCOUNTER — TELEPHONE (OUTPATIENT)
Dept: FAMILY MEDICINE CLINIC | Facility: CLINIC | Age: 79
End: 2024-06-12

## 2024-06-12 NOTE — TELEPHONE ENCOUNTER
Pt returned missed call, relayed Result Note communication from below.  RN provided pt with phone number to schedule PT from referral placed.    Result Note    XR LUMBAR SPINE (MIN 4 VIEWS) (CPT=72110): Result Notes     Grace Lemons RN  6/12/2024  9:33 AM CDT       Attempted to call patient, left message to call office.      Kathy Mayer MD  6/11/2024 10:42 PM CDT       Mod to severe lumbar degenerative changes, advise to do PT

## 2024-06-19 ENCOUNTER — OFFICE VISIT (OUTPATIENT)
Dept: PHYSICAL THERAPY | Age: 79
End: 2024-06-19
Attending: INTERNAL MEDICINE

## 2024-06-19 DIAGNOSIS — M54.30 SCIATICA, UNSPECIFIED LATERALITY: ICD-10-CM

## 2024-06-19 DIAGNOSIS — M51.36 DDD (DEGENERATIVE DISC DISEASE), LUMBAR: Primary | ICD-10-CM

## 2024-06-19 PROCEDURE — 97161 PT EVAL LOW COMPLEX 20 MIN: CPT

## 2024-06-19 PROCEDURE — 97110 THERAPEUTIC EXERCISES: CPT

## 2024-06-19 NOTE — PROGRESS NOTES
SPINE EVALUATION:     Diagnosis:   DDD (degenerative disc disease), lumbar (M51.36)  Sciatica, unspecified laterality (M54.30)      Referring Provider: Kathy Mayer  Date of Evaluation:    6/19/2024    Precautions:  None Next MD visit:   none scheduled  Date of Surgery: n/a     PATIENT SUMMARY   Jose Angel Rodriguez is a 79 year old male who presents to therapy today with complaints of left leg pain that began in May after mowing the lawn for the first time. No issues while completing the task, but the pain in the leg hit afterwards. Couldn't sit without pain. Completed a Dose pack. Following completion, had to mow the lawn again. The next day, couldn't move the left leg. Went back into the doctor and x-rays were completed. Prescribed Tramadol and tylenol. Mild complaints of N/T in the left leg   Able to play 18 holes with medication.  Chief complaint: sitting (5-10 min before pain begins)  Pt describes pain level current 2/10, at best 0/10, at worst 8-9/10. Pain described as cramping sensation, sharp pain   Current functional limitations include mowing the lawn, playing golf without the need for medication.   Severity: moderate  Irritability: moderate  Nature of symptoms: mechanical/inflammatory  Stage: subacute  Stability: high    24 hour pattern: feels about the same in the morning and the evening.  Aggravating factors: sitting  Relieving factors: medication, standing, moving around    P's: P1 left buttock region  P2 left posterior thigh  P3 left calf  Jose Angel describes prior level of function fairly active with recreational activities. Pt goals include get rid of the pain .  Past medical history was reviewed with Jose Angel. Significant findings include   Past Medical History:    Atrial fibrillation (HCC)    Basal cell carcinoma    Basal cell carcinoma of chin    Cellulitis of right lower extremity    Diabetes mellitus (HCC)    Diabetic foot ulcer (HCC)    Goiter    Heart palpitations    History of skin cancer    Moderate  mitral regurgitation    Skin cancer    Stopped smoking with greater than 40 pack year history   No previous back or hip injuries     Pt denies bowel/bladder changes, saddle anesthesia, and DENNISE LE N/T.    ASSESSMENT  Jose Angel presents to physical therapy evaluation with primary c/o left sided buttock pain, posterior thigh pain and lateral calf pain . The results of the objective tests and measures show decreases hip range of motion, decreased lumbar range of motion, hypomobility of the lumbar and thoracic spine.  Functional deficits include but are not limited to completion of recreational activities without needing medication.  Signs and symptoms are consistent with diagnosis of L5 lumbar radiculopathy . Pt and PT discussed evaluation findings, pathology, POC and HEP.  Pt voiced understanding and performs HEP correctly without reported pain. Skilled Physical Therapy is medically necessary to address the above impairments and reach functional goals.     OBJECTIVE:   Observation/Posture: increased kyphosis  Neuro Screen: negative pathologic reflexes, 2+ bilateral patella and achilles reflex. Intact light touch sensation except bilateral lower leg and foot due to patient report neuropathy.     Lumbar AROM: (* denotes performed with pain)  Flexion: 22 inches from the ground. Pulling in the hamstrings   Extension: 15 degs   Sidebending: R 3 inches from the knee joint line; L 3 inches from the knee joint line    Accessory motion: hypomobile throughout lumbar and thoracic spine. No symptom reproduction. No reports of pain   Palpation: tightness revealed in bilateral lumbar paraspinals    Strength: (* denotes performed with pain)  LE   Hip flexion (L2): R 3+/5; L 3+/5  Hip abduction: R 3+/5; L 3+/5  Hip Extension: R 3+/5; L 3+/5   Hip ER: R 3+/5; L 3+/5  Hip IR: R 3+/5; L 3+/5  Knee Flexion: R 3+/5; L 3+*P2/5   Knee extension (L3): R 3+/5; L 3+/5   DF (L4): R 5/5; L 5/5  Great Toe Ext (L5): R 5/5, L 5/5  PF (S1): R 5/5; L  5/5  Unable to toe walk     Hip  IR: R 10 deg L 10 deg   ER: R 20 L 45     Flexibility:   LE   Hip Flexor: R90 deg prone, L 90 deg prone  Hamstrings: R 45 deg* pulling in hamstrings, no change with sensitizers; L 30 deg*pulling in hamstrings, no change with sensitizers   Piriformis: R -; L -       Special tests:   Repeated Motions Testing: negative for sx reproduction   Lumbar Quadrant Testing: negative for sx reproduction   Slump: negative for  reproduction of sx. Tightness in the hamstrings   SLR: R 45, L 30      Gait: pt ambulates on level ground with normal mechanics, decreased hip/knee flex or ext of bilateral knees, and decreased perico .  Balance: SLS R 8, L 10    Today’s Treatment and Response: responded well to all exercises. Patient demonstrated proper mechanics with minimal tactile and verbal cues.   Pt education was provided on exam findings, treatment diagnosis, treatment plan, expectations, and prognosis. Pt was also provided recommendations for activity modifications, possible soreness after evaluation, and importance of remaining active  Patient was instructed in and issued a HEP for: Access Code: [email or web address]  URL: [email or web address]  Date: 06/19/2024  Prepared by: Yaw Mcguire    Program Notes  Nino Rodriguez    Exercises  - Supine Lower Trunk Rotation  - 3 x daily - 7 x weekly - 2 sets - 10 reps  - Supine Pelvic Tilt  - 3 x daily - 7 x weekly - 2 sets - 10 reps  - Supine 90/90 Sciatic Nerve Glide with Knee Flexion/Extension  - 3 x daily - 7 x weekly - 2 sets - 10 reps  - Hip Flexor Stretch with Chair  - 3 x daily - 7 x weekly - 4 sets - 15 hold    Charges: PT Eval Low Complexity, 1 TherEx      Total Timed Treatment: 10  min     Total Treatment Time: 45 min     Based on clinical rationale and outcome measures, this evaluation involved Low Complexity decision making due to 1-2 personal factors/comorbidities, 3 body structures involved/activity limitations, and evolving symptoms  including changing pain levels.  PLAN OF CARE:    Goals: (to be met in 8 visits)   Pt will improve transversus abdominis recruitment to perform proper isometric contraction without requiring verbal or tactile cuing to promote advancement of therex   Pt will demonstrate good understanding of proper posture and body mechanics to decrease pain and improve spinal safety   Pt will improve lumbar spine AROM flexion to >20 deg to allow increase ease with bending forward to don shoes   Pt will report improved symptom centralization and absence of radicular symptoms for 3 consecutive days to improve function with ADL   Pt will demonstrate improved core strength to be able to perform lawn mowing with <2/10 pain   Pt will demonstrate ability to sit from greater than 10 minutes without increased symptoms   Pt will be independent and compliant with comprehensive HEP to maintain progress achieved in PT       Frequency / Duration: Patient will be seen for 2 x/week or a total of 8 visits over a 90 day period. Treatment will include: Manual Therapy, Mechanical Traction, Neuromuscular Re-education, Therapeutic Activities, Therapeutic Exercise, and Home Exercise Program instruction    Education or treatment limitation: None  Rehab Potential:good    LEFS Score  LEFS Score: 85 % (6/19/2024 10:42 AM)      Patient/Family/Caregiver was advised of these findings, precautions, and treatment options and has agreed to actively participate in planning and for this course of care.    Thank you for your referral. Please co-sign or sign and return this letter via fax as soon as possible to [phone]. If you have any questions, please contact me at Dept: [phone]    Sincerely,  Electronically signed by therapist: Yaw Mcguire, PT    Physician's certification required: Yes  I certify the need for these services furnished under this plan of treatment and while under my care.    X___________________________________________________  Date____________________    Certification From: 6/19/2024  To:9/17/2024

## 2024-06-25 ENCOUNTER — MED REC SCAN ONLY (OUTPATIENT)
Dept: INTERNAL MEDICINE CLINIC | Facility: CLINIC | Age: 79
End: 2024-06-25

## 2024-06-25 ENCOUNTER — OFFICE VISIT (OUTPATIENT)
Dept: PHYSICAL THERAPY | Age: 79
End: 2024-06-25
Attending: INTERNAL MEDICINE

## 2024-06-25 PROCEDURE — 97110 THERAPEUTIC EXERCISES: CPT

## 2024-06-25 PROCEDURE — 97140 MANUAL THERAPY 1/> REGIONS: CPT

## 2024-06-25 NOTE — PROGRESS NOTES
Diagnosis:   DDD (degenerative disc disease), lumbar (M51.36)  Sciatica, unspecified laterality (M54.30)     PT diagnosis: L5 lumbar radiculopathy      Referring Provider: Kathy Mayer  Date of Evaluation:    6/19/2024    Precautions:  None Next MD visit:   none scheduled  Date of Surgery: n/a   Insurance Primary/Secondary: MEDICARE / [employer] [place] PPO     # Auth Visits: no auth, no limit (POC 8 visits)            Subjective: I feel as though the leg pain has improved. I haven't been taking the higher grade medication. I was able to golf over the weekend with just taking tylenol.     Pain: 1/10 left lower leg pain , 0/10 left lower leg at conclusion of session      Objective:   Lumbar AROM: (* denotes performed with pain)  Flexion: 20 inches from the ground. Pulling in the hamstrings, 18 inches from ground at end of session    Hypomobile throughout lumbar spine    Assessment: patient tolerated session well. Patient presented to session with mild complaints of left lower leg pain. Patient demonstrated improvement of lumbar flexion compared to evaluation, and further improvement at the conclusion of the session. Following completion of nerve glides, patient reported that the left leg pain had diminished and didn't return throughout the remainder of the session. Further lumbar stretches added to HEP.      Goals: (to be met in 8 visits)   Pt will improve transversus abdominis recruitment to perform proper isometric contraction without requiring verbal or tactile cuing to promote advancement of therex   Pt will demonstrate good understanding of proper posture and body mechanics to decrease pain and improve spinal safety   Pt will improve lumbar spine AROM flexion to >20 deg to allow increase ease with bending forward to don shoes   Pt will report improved symptom centralization and absence of radicular symptoms for 3 consecutive days to improve function with ADL   Pt will demonstrate improved core strength to be able to  perform lawn mowing with <2/10 pain   Pt will demonstrate ability to sit from greater than 10 minutes without increased symptoms   Pt will be independent and compliant with comprehensive HEP to maintain progress achieved in PT     Plan: continue with mobility of the spine, stretching of the lumbar spine, and continue with nerve glides for centralization of symptoms.   Date: 6/25/2024  TX#: 2/8 Date:                 TX#: 3/ Date:                 TX#: 4/ Date:                 TX#: 5/ Date:   Tx#: 6/   TherEx - 30 min  NuStep level 4, 6 min  Supine LTR 2 x 10  Supine pelvic tilts 2 x 10  Seated lumbar flexion 2 x 10  Seated piriformis stretch with foot on chair 3 x 20 sec holds ea leg  Hip flexor stretch on 14 inch step 5 x 15 sec holds       Neuro Re-ed 5 min  Nerve glides 2 x 15   Supine 90/90 Sciatic Nerve Glide with Knee Flexion/Extension 2 x 10        Manual Therapy - 10 min  Central PA L1-L5 grade IV  Right unilateral PA L1-L5 grade IV  Prone quad stretch 3 x 30 sec holds              HEP: Access Code: [email or web address]  URL: [email or web address]  Date: 06/19/2024  Prepared by: Yaw Mcguire     Program Notes  Nino Rodriguez     Exercises  - Supine Lower Trunk Rotation  - 3 x daily - 7 x weekly - 2 sets - 10 reps  - Supine Pelvic Tilt  - 3 x daily - 7 x weekly - 2 sets - 10 reps  - Supine 90/90 Sciatic Nerve Glide with Knee Flexion/Extension  - 3 x daily - 7 x weekly - 2 sets - 10 reps  - Hip Flexor Stretch with Chair  - 3 x daily - 7 x weekly - 4 sets - 15 hold    Charges: 2 TherEx 1 MT       Total Timed Treatment: 45 min  Total Treatment Time: 45 min

## 2024-06-28 ENCOUNTER — OFFICE VISIT (OUTPATIENT)
Dept: PHYSICAL THERAPY | Age: 79
End: 2024-06-28
Attending: INTERNAL MEDICINE

## 2024-06-28 PROCEDURE — 97110 THERAPEUTIC EXERCISES: CPT

## 2024-06-28 NOTE — PROGRESS NOTES
Diagnosis:   DDD (degenerative disc disease), lumbar (M51.36)  Sciatica, unspecified laterality (M54.30)     PT diagnosis: L5 lumbar radiculopathy      Referring Provider: Kathy Mayer  Date of Evaluation:    6/19/2024    Precautions:  None Next MD visit:   none scheduled  Date of Surgery: n/a   Insurance Primary/Secondary: MEDICARE / [employer] [place]     # Auth Visits: no auth, no limit (POC 8 visits)           Discharge Summary  Pt has attended 3 visits in Physical Therapy.     Subjective: I feel as though the pain in the leg is similar. After our last session, the leg pain didn't come back until the next day. I am very happy with where I am at right now. I am able to do everything that I would like to do with no issues or increased symptoms.     Pain: 1/10 left lower leg pain , 0/10 left lower leg at conclusion of session      Objective:   Lumbar AROM: (* denotes performed with pain)  Flexion: 19 inches from the ground. Pulling in the hamstrings, 18 inches from ground at end of session  SLR: L 45 pulling in hamstring R 50 pulling in hamstring      Assessment: patient tolerated session well. Patient presented to session with mild complaints of left lower leg pain. Patient reports that he is comfortable with her current state of symptoms, and would like to discharge from therapy at this time to continue with his HEP on own. Patient reports that he is able to complete his desired recreational activities and activities around the house without discomfort of pain in the back or legs. At this time, the patient is discharged with instructions to continue with current HEP. Patient educated to reach out to therapist with any questions.       Goals: (to be met in 8 visits)   Pt will improve transversus abdominis recruitment to perform proper isometric contraction without requiring verbal or tactile cuing to promote advancement of therex   Pt will demonstrate good understanding of proper posture and body mechanics to decrease  pain and improve spinal safety MET  Pt will improve lumbar spine AROM flexion to >20 deg to allow increase ease with bending forward to don shoes   Pt will report improved symptom centralization and absence of radicular symptoms for 3 consecutive days to improve function with ADL   Pt will demonstrate improved core strength to be able to perform lawn mowing with <2/10 pain MET  Pt will demonstrate ability to sit from greater than 10 minutes without increased symptoms   Pt will be independent and compliant with comprehensive HEP to maintain progress achieved in PT MET  Post LEFS Score  Post LEFS Score: 96.25 % (6/28/2024  9:32 AM)    11.25 % improvement    Plan: Discharge from skilled therapy with instructions to continue with HEP    Patient/Family/Caregiver was advised of these findings, precautions, and treatment options and has agreed to actively participate in planning and for this course of care.    Thank you for your referral. If you have any questions, please contact me at Dept: [phone].    Sincerely,  Electronically signed by therapist: Yaw Mcguire, PT     Physician's certification required:  Yes  Please co-sign or sign and return this letter via fax as soon as possible to [phone].   I certify the need for these services furnished under this plan of treatment and while under my care.    X___________________________________________________ Date____________________    Certification From: 6/28/2024  To:9/26/2024     Plan: Discharge from skilled therapy with instructions to continue with HEP  Date: 6/25/2024  TX#: 2/8 Date:6/28/2024                 TX#: 3/8 Date:                 TX#: 4/ Date:                 TX#: 5/ Date:   Tx#: 6/   TherEx - 30 min  NuStep level 4, 6 min  Supine LTR 2 x 10  Supine pelvic tilts 2 x 10  Seated lumbar flexion 2 x 10  Seated piriformis stretch with foot on chair 3 x 20 sec holds ea leg  Hip flexor stretch on 14 inch step 5 x 15 sec holds TherEx - 25 min  Supine LTR 2  x 10  Seated hamstring stretch 3 x 30 sec holds ea leg  Seated lumbar flexion 2 x 10  Seated piriformis stretch with foot on chair 3 x 20 sec holds ea leg  Education on discharge, importance of continuing with HEP      Neuro Re-ed 5 min  Nerve glides 2 x 15   Supine 90/90 Sciatic Nerve Glide with Knee Flexion/Extension 2 x 10  Neuro Re-ed 5 min  Nerve glides 2 x 15   Supine 90/90 Sciatic Nerve Glide with Knee Flexion/Extension 2 x 10       Manual Therapy - 10 min  Central PA L1-L5 grade IV  Right unilateral PA L1-L5 grade IV  Prone quad stretch 3 x 30 sec holds              HEP: Access Code: [email or web address]  URL: [email or web address]  Date: 06/28/2024  Prepared by: Yaw Mcguire    Program Notes  Nino Rodriguez    Exercises  - Supine Lower Trunk Rotation  - 3 x daily - 7 x weekly - 2 sets - 10 reps  - Supine Pelvic Tilt  - 3 x daily - 7 x weekly - 2 sets - 10 reps  - Supine 90/90 Sciatic Nerve Glide with Knee Flexion/Extension  - 3 x daily - 7 x weekly - 2 sets - 10 reps  - Hip Flexor Stretch with Chair  - 3 x daily - 7 x weekly - 4 sets - 15 hold  - Seated Lumbar Flexion Stretch  - 3 x daily - 7 x weekly - 2 sets - 10 reps - 5 hold  - Seated Piriformis Stretch with Trunk Bend  - 3 x daily - 7 x weekly - 3 sets  - Seated Hamstring Stretch  - 3 x daily - 7 x weekly - 3 sets - 30 hold    Charges: 2 TherEx       Total Timed Treatment: 25 min  Total Treatment Time: 25 min

## 2024-07-02 ENCOUNTER — APPOINTMENT (OUTPATIENT)
Dept: PHYSICAL THERAPY | Age: 79
End: 2024-07-02
Attending: INTERNAL MEDICINE
Payer: MEDICARE

## 2024-07-09 ENCOUNTER — APPOINTMENT (OUTPATIENT)
Dept: PHYSICAL THERAPY | Age: 79
End: 2024-07-09
Attending: INTERNAL MEDICINE
Payer: MEDICARE

## 2024-07-11 ENCOUNTER — APPOINTMENT (OUTPATIENT)
Dept: PHYSICAL THERAPY | Age: 79
End: 2024-07-11
Attending: INTERNAL MEDICINE
Payer: MEDICARE

## 2024-07-15 DIAGNOSIS — E11.40 TYPE 2 DIABETES MELLITUS WITH DIABETIC NEUROPATHY, WITHOUT LONG-TERM CURRENT USE OF INSULIN (HCC): ICD-10-CM

## 2024-07-18 RX ORDER — EMPAGLIFLOZIN 25 MG/1
1 TABLET, FILM COATED ORAL DAILY
Qty: 90 TABLET | Refills: 0 | Status: SHIPPED | OUTPATIENT
Start: 2024-07-18

## 2024-07-18 RX ORDER — GLIPIZIDE 10 MG/1
20 TABLET, FILM COATED, EXTENDED RELEASE ORAL EVERY MORNING
Qty: 180 TABLET | Refills: 0 | Status: SHIPPED | OUTPATIENT
Start: 2024-07-18

## 2024-07-18 NOTE — TELEPHONE ENCOUNTER
Please review; protocol failed/ has no protocol    Requested Prescriptions   Pending Prescriptions Disp Refills    JARDIANCE 25 MG Oral Tab [Pharmacy Med Name: Jardiance 25 mg tablet] 90 tablet 0     Sig: TAKE ONE TABLET BY MOUTH DAILY       Diabetes Medication Protocol Failed - 7/18/2024 10:45 AM        Failed - Last A1C < 7.5 and within past 6 months     Lab Results   Component Value Date    A1C 8.1 (A) 01/18/2024             Passed - In person appointment or virtual visit in the past 6 mos or appointment in next 3 mos     Recent Outpatient Visits              [facility] weeks [facility]    3 weeks [facility]    [facility] Visit    4 weeks ago DDD (degenerative disc disease), lumbar    [facility]    Office Visit    1 month ago Left sided sciatica    [address] Kathy Mayer MD    Office Visit    6 months ago Type 2 diabetes mellitus with diabetic neuropathy, without long-term current use of insulin ([facility])    [address] Latia Newton PA-C    Office Visit          Future Appointments         Provider Department Appt Notes    In 1 month Latia Newton PA-C [address]    last 7/13/23                    Passed - Microalbumin procedure in past 12 months or taking ACE/ARB        Passed - EGFRCR or GFRNAA > 50     GFR Evaluation  EGFRCR: 57 , resulted on 1/22/2024          Passed - GFR in the past 12 months          GLIPIZIDE ER 10 MG Oral Tablet 24 Hr [Pharmacy Med Name: glipizide ER 10 mg tablet, extended release 24 hr] 180 tablet 0     Sig: TAKE TWO TABLETS BY MOUTH EVERY MORNING       Diabetes Medication Protocol Failed - 7/15/2024 10:54 AM        Failed - Last A1C < 7.5 and within past 6  months     Lab Results   Component Value Date    A1C 8.1 (A) 01/18/2024             Passed - In person appointment or virtual visit in the past 6 mos or appointment in next 3 mos     Recent Outpatient Visits              2 weeks ago     [facility] [place]    [facility] Visit    3 weeks [facility]    [facility]    4 weeks ago DDD (degenerative disc disease), lumbar    [facility]    [facility] Visit    1 month ago Left sided sciatica    [address] Kathy Mayer MD    Office Visit    6 months ago Type 2 diabetes mellitus with diabetic neuropathy, without long-term current use of insulin ([facility])    [address] Latia Newton PA-C    Office Visit          Future Appointments         Provider Department Appt Notes    In 1 month Latia Newton PA-C [address]    last 7/13/23                    Passed - Microalbumin procedure in past 12 months or taking ACE/ARB        Passed - EGFRCR or GFRNAA > 50     GFR Evaluation  EGFRCR: 57 , resulted on 1/22/2024          Passed - GFR in the past 12 months           Recent Outpatient Visits              2 weeks ago     [facility]    [facility]    3 weeks ago     [facility] [place]    Office Visit    4 weeks ago DDD (degenerative disc disease), lumbar    [facility] [place]    Office Visit    1 month ago Left sided sciatica    [address] Kathy Mayer MD    Office Visit    6 months ago Type 2 diabetes mellitus with diabetic neuropathy, without long-term current use of insulin ([facility])    [facility]  [address] Latia Newton PA-C    Office Visit          Future Appointments         Provider Department Appt Notes    In 1 month Latia Newton PA-C [address]    last 7/13/23

## 2024-08-22 ENCOUNTER — LAB ENCOUNTER (OUTPATIENT)
Dept: LAB | Age: 79
End: 2024-08-22
Attending: INTERNAL MEDICINE
Payer: MEDICARE

## 2024-08-22 DIAGNOSIS — E11.40 TYPE 2 DIABETES MELLITUS WITH DIABETIC NEUROPATHY, WITHOUT LONG-TERM CURRENT USE OF INSULIN (HCC): ICD-10-CM

## 2024-08-22 LAB
ANION GAP SERPL CALC-SCNC: 4 MMOL/L (ref 0–18)
BUN BLD-MCNC: 18 MG/DL (ref 9–23)
CALCIUM BLD-MCNC: 9.3 MG/DL (ref 8.7–10.4)
CHLORIDE SERPL-SCNC: 106 MMOL/L (ref 98–112)
CO2 SERPL-SCNC: 28 MMOL/L (ref 21–32)
CREAT BLD-MCNC: 1.07 MG/DL
CREAT UR-SCNC: 61.4 MG/DL
EGFRCR SERPLBLD CKD-EPI 2021: 70 ML/MIN/1.73M2 (ref 60–?)
FASTING STATUS PATIENT QL REPORTED: YES
GLUCOSE BLD-MCNC: 194 MG/DL (ref 70–99)
MICROALBUMIN UR-MCNC: 0.5 MG/DL
MICROALBUMIN/CREAT 24H UR-RTO: 8.1 UG/MG (ref ?–30)
OSMOLALITY SERPL CALC.SUM OF ELEC: 293 MOSM/KG (ref 275–295)
POTASSIUM SERPL-SCNC: 4.6 MMOL/L (ref 3.5–5.1)
SODIUM SERPL-SCNC: 138 MMOL/L (ref 136–145)

## 2024-08-22 PROCEDURE — 80048 BASIC METABOLIC PNL TOTAL CA: CPT

## 2024-08-22 PROCEDURE — 82570 ASSAY OF URINE CREATININE: CPT

## 2024-08-22 PROCEDURE — 82043 UR ALBUMIN QUANTITATIVE: CPT

## 2024-08-22 PROCEDURE — 83036 HEMOGLOBIN GLYCOSYLATED A1C: CPT

## 2024-08-22 PROCEDURE — 36415 COLL VENOUS BLD VENIPUNCTURE: CPT

## 2024-08-23 LAB
EST. AVERAGE GLUCOSE BLD GHB EST-MCNC: 169 MG/DL (ref 68–126)
HBA1C MFR BLD: 7.5 % (ref ?–5.7)

## 2024-08-28 ENCOUNTER — OFFICE VISIT (OUTPATIENT)
Dept: INTERNAL MEDICINE CLINIC | Facility: CLINIC | Age: 79
End: 2024-08-28
Payer: MEDICARE

## 2024-08-28 VITALS
SYSTOLIC BLOOD PRESSURE: 118 MMHG | WEIGHT: 182.81 LBS | OXYGEN SATURATION: 98 % | BODY MASS INDEX: 23.46 KG/M2 | HEART RATE: 64 BPM | DIASTOLIC BLOOD PRESSURE: 70 MMHG | HEIGHT: 74 IN | RESPIRATION RATE: 18 BRPM | TEMPERATURE: 97 F

## 2024-08-28 DIAGNOSIS — D69.6 THROMBOCYTOPENIA (HCC): ICD-10-CM

## 2024-08-28 DIAGNOSIS — Z85.828 HISTORY OF SKIN CANCER: ICD-10-CM

## 2024-08-28 DIAGNOSIS — E11.40 TYPE 2 DIABETES MELLITUS WITH DIABETIC NEUROPATHY, WITHOUT LONG-TERM CURRENT USE OF INSULIN (HCC): ICD-10-CM

## 2024-08-28 DIAGNOSIS — Z00.00 ROUTINE GENERAL MEDICAL EXAMINATION AT A HEALTH CARE FACILITY: Primary | ICD-10-CM

## 2024-08-28 DIAGNOSIS — Z79.01 LONG TERM (CURRENT) USE OF ANTICOAGULANTS: ICD-10-CM

## 2024-08-28 DIAGNOSIS — F17.290 CIGAR SMOKER: ICD-10-CM

## 2024-08-28 DIAGNOSIS — I10 ESSENTIAL HYPERTENSION: ICD-10-CM

## 2024-08-28 DIAGNOSIS — H25.091 AGE-RELATED INCIPIENT CATARACT OF RIGHT EYE: ICD-10-CM

## 2024-08-28 DIAGNOSIS — I48.20 CHRONIC ATRIAL FIBRILLATION (HCC): ICD-10-CM

## 2024-08-28 DIAGNOSIS — E78.5 DYSLIPIDEMIA: ICD-10-CM

## 2024-08-28 DIAGNOSIS — I34.0 MODERATE MITRAL REGURGITATION: ICD-10-CM

## 2024-08-28 DIAGNOSIS — E04.2 NONTOXIC MULTINODULAR GOITER: ICD-10-CM

## 2024-08-28 PROCEDURE — G0439 PPPS, SUBSEQ VISIT: HCPCS | Performed by: PHYSICIAN ASSISTANT

## 2024-08-28 NOTE — PROGRESS NOTES
REASON FOR VISIT:    Jose Angel Rodriguez is a 79 year old male who presents for a Medicare Annual Wellness visit.    Thyroid nodule. Last US 10/2022. Due for repeat US.     DM. Currently taking jardiance 25 mg daily, glipizide 20 mg daily, janumet  mg bid. Tolerating meds well. A1c better at 7.5% (down from 8.1).   Eye exam 5/2024.     Patient Care Team: Patient Care Team:  Kathy Mayer MD as PCP - General (Internal Medicine)  Pedrito Wilkinson DPM (PODIATRIST)  Rogelio Clemons MD (Cardiovascular Diseases)  Janell Olguin MD (DERMATOLOGY)  Latia Newton PA-C (Physician Assistant Medical)  Yaw Mcguire PT as Physical Therapist (Physical Therapy)    Patient Active Problem List   Diagnosis    Nontoxic multinodular goiter    Atrial fibrillation (HCC)    Moderate mitral regurgitation    Essential hypertension    Long term (current) use of anticoagulants    Type 2 diabetes mellitus with diabetic neuropathy, without long-term current use of insulin (HCC)    Cataract, right eye    Cigar smoker    Dyslipidemia    Thrombocytopenia (HCC)    History of skin cancer     Current Outpatient Medications   Medication Sig Dispense Refill    Empagliflozin (JARDIANCE) 25 MG Oral Tab Take 1 tablet by mouth daily. 90 tablet 0    glipiZIDE ER 10 MG Oral Tablet 24 Hr Take 2 tablets (20 mg total) by mouth every morning. 180 tablet 0    traMADol 50 MG Oral Tab Take 1 tablet (50 mg total) by mouth 2 (two) times daily as needed for Pain. 30 tablet 0    dilTIAZem  MG Oral Capsule SR 24 Hr Take 1 capsule (120 mg total) by mouth daily.      methylPREDNISolone (MEDROL) 4 MG Oral Tablet Therapy Pack As directed. 1 each 0    rosuvastatin 20 MG Oral Tab TAKE ONE TABLET BY MOUTH DAILY 90 tablet 3    apixaban 5 MG Oral Tab Take 1 tablet (5 mg total) by mouth 2 (two) times daily.      Calcipotriene 0.005 % External Ointment Apply topically.      fluorouracil 5 % External Cream Apply topically.      Sildenafil Citrate 50 MG  Oral Tab Take 1 tablet (50 mg total) by mouth daily as needed for Erectile Dysfunction. 10 tablet 3    SITagliptin-metFORMIN HCl (JANUMET)  MG Oral Tab TAKE ONE TABLET BY MOUTH TWICE DAILY with meals 180 tablet 3    ENALAPRIL 2.5 MG Oral Tab TAKE 1 TABLET (2.5 MG TOTAL) BY MOUTH DAILY. 90 tablet 1    METOPROLOL SUCCINATE 100 MG Oral Tablet 24 Hr TAKE 1/2 TABLET BY MOUTH DAILY 45 tablet 1    Clobetasol Propionate (TEMOVATE) 0.05 % Apply Externally Ointment Apply 1 Application topically as needed.  0    Cholecalciferol (VITAMIN D) 1000 UNITS Oral Tab Take 2 tablets by mouth daily.      MULTIVITAMIN OR 1 tab po daily      VITAMIN C OR 500mg po daily      GLUCOSAMINE CHONDROITIN ADV OR 1 tab po daily             Latest Ref Rng & Units 8/22/2024     8:20 AM 1/22/2024     8:14 AM 1/18/2024     8:54 AM 7/10/2023     8:26 AM 3/14/2023     8:21 AM 12/14/2022     7:50 AM 5/24/2022     7:52 AM   Glucose and HbA1c   Glucose 70 - 99 mg/dL 194  197   165  192  201  144    HbA1c 4.3 - 5.6 %   8.1              Latest Ref Rng & Units 1/22/2024     8:14 AM 12/14/2022     7:50 AM 5/24/2022     7:52 AM 4/19/2022     8:18 AM 3/9/2021     8:20 AM 5/21/2020    11:32 AM 4/5/2019     8:20 AM   Cholesterol   Total Cholesterol <200 mg/dL 146  154  154  116  161  138  156    Triglycerides 30 - 149 mg/dL 128  128  114  125  126  143  159    HDL 40 - 59 mg/dL 49  47  46  37  44  40  47    LDL <100 mg/dL 74  84  87  57  92  69  77          Latest Ref Rng & Units 8/22/2024     8:20 AM 1/22/2024     8:14 AM 7/10/2023     8:26 AM 3/14/2023     8:21 AM 12/14/2022     7:50 AM 5/24/2022     7:52 AM 4/19/2022     8:18 AM   BUN and Cr   BUN 9 - 23 mg/dL 18  21  19  19  20  16  19    Creatinine 0.70 - 1.30 mg/dL 1.07  1.27  1.00  1.09  1.13  0.96  1.00          Latest Ref Rng & Units 1/22/2024     8:14 AM 3/14/2023     8:21 AM 12/14/2022     7:50 AM 5/24/2022     7:52 AM 4/19/2022     8:18 AM 3/9/2021     8:20 AM 5/21/2020    11:32 AM   AST and ALT    AST (SGOT) 15 - 37 U/L 17  17  17  22  15  11  18    ALT (SGPT)   31  30  23  34  29  27          Latest Ref Rng & Units 7/10/2023     8:26 AM 5/24/2022     7:52 AM 10/6/2020     8:26 AM 4/5/2019     8:20 AM 4/25/2018     8:36 AM 3/8/2017     9:11 AM 3/8/2016     9:22 AM   TSH and Free T4   TSH 0.358 - 3.740 mIU/mL 1.050  1.450  1.530  1.460  1.350  1.260  1.490         General Health      In the past six months, have you lost more than 10 pounds without trying?: 2 - No  Has your appetite been poor?: No  Type of Diet: Balanced  How does the patient maintain a good energy level?: Daily Walks  How would you describe your daily physical activity?: Moderate  How would you describe your current health state?: Good  How do you maintain positive mental well-being?: Visiting Family;Social Interaction;Puzzles;Games  Have you had any immunizations at another office such as Influenza, Hepatitis B, Tetanus, or Pneumococcal?: No     Functional Ability     Bathing or Showering: Able without help  Toileting: Able without help  Dressing: Able without help  Eating: Able without help  Driving: Able without help  Preparing your meals: Able without help  Managing money/bills: Able without help  Taking medications as prescribed: Able without help  Are you able to afford your medications?: Yes  Hearing Problems?: No     Functional Status     Hearing Problems?: No  Vision Problems? : No  Difficulty walking?: No  Difficulty dressing or bathing?: No  Problems with daily activities? : No  Memory Problems?: No      Fall/Risk Assessment                 Depression Screening (PHQ-2/PHQ-9): Over the LAST 2 WEEKS   Little interest or pleasure in doing things (over the last two weeks)?: Not at all  Feeling down, depressed, or hopeless (over the last two weeks)?: Not at all  PHQ-2 SCORE: 0        Advance Directives     Do you have a healthcare power of ?: No  Do you have a living will?: No     Cognitive Assessment     What day of the week  is this?: Correct  What month is it?: Correct  What year is it?: Correct  Recall \"Ball\": Correct  Recall \"Flag\": Incorrect  Recall \"Tree\": Correct         PREVENTATIVE SERVICES   INDICATIONS AND SCHEDULE Internal Lab or Procedure   Diabetes Screening     HbgA1C   Annually HEMOGLOBIN A1C (%)   Date Value   01/18/2024 8.1 (A)     HgbA1C (%)   Date Value   08/22/2024 7.5 (H)       Fasting Blood Sugar (FSB)Annually Glucose (mg/dL)   Date Value   08/22/2024 194 (H)     GLUCOSE (mg/dL)   Date Value   11/05/2013 191 (H)      Cardiovascular Disease Screening    LDL Annually LDL Cholesterol (mg/dL)   Date Value   01/22/2024 74     LDL CHOLESTROL (mg/dL)   Date Value   11/05/2013 87       EKG - w/ Initial Preventative Physical Exam only, or if medically necessary    Colorectal Cancer Screening     Colonoscopy Screen every 10 years No recommendations at this time    Flex Sigmoidoscopy Screen every 5 years No results found for this or any previous visit.    Fecal Occult Blood Annually No results found for: \"FOB\", \"OCCULTSTOOL\"   Glaucoma Screening     Ophthalmology Visit Annually    Immunizations     Zoster (Not covered by Medicare Part B) No orders found for this or any previous visit.     SPECIFIC DISEASE MONITORING Internal Lab or Procedure     Annual Monitoring of Persistent Medications  (ACE/ARB, Digoxin, Diuretics)        Potassium  Annually Potassium (mmol/L)   Date Value   08/22/2024 4.6     POTASSIUM (mmol/L)   Date Value   11/05/2013 4.8   03/15/2011 4.4       Creatinine  Annually CREATININE (mg/dL)   Date Value   11/05/2013 0.73     Creatinine (mg/dL)   Date Value   08/22/2024 1.07       Digoxin Serum Conc  Annually No results found for: \"DIGOXIN\"     Diabetes     HgbA1C  Annually HEMOGLOBIN A1C (%)   Date Value   01/18/2024 8.1 (A)     HgbA1C (%)   Date Value   08/22/2024 7.5 (H)       Creat/alb ratio  Annually CREATININE (mg/dL)   Date Value   11/05/2013 0.73     Creatinine (mg/dL)   Date Value   08/22/2024 1.07        LDL  Annually LDL Cholesterol (mg/dL)   Date Value   2024 74     LDL CHOLESTROL (mg/dL)   Date Value   2013 87       Dilated Eye exam  Annually     2024    11:46 AM   Data entered on:   Last Dilated Eye Exam 2024              ALLERGIES:   No Known Allergies  MEDICAL INFORMATION:   Past Medical History:    Atrial fibrillation (HCC)    Basal cell carcinoma    Basal cell carcinoma of chin    Cellulitis of right lower extremity    Diabetes mellitus (HCC)    Diabetic foot ulcer (HCC)    Goiter    Heart palpitations    History of skin cancer    Moderate mitral regurgitation    Skin cancer    Stopped smoking with greater than 40 pack year history      Past Surgical History:   Procedure Laterality Date    Colonoscopy      Full graft proc nos,ear,lid <20sqcm  2010    Performed by ANTELMO DEXTER at [facility] [place], [facility]    Incision and drainage  2015    R foot wound    Laser surgery of eye  1999    corneal LASIK    Other surgical history      MOHS     Other surgical history  ,    to remove 2 BCC    Tonsillectomy        Family History   Problem Relation Age of Onset    Other (ALzheimer's Disease) Mother 52         at 69    Other (Liver Cancer) Paternal Grandfather     Other (Lung Cancer) Father          at 80    Other (stroke sydrome) Maternal Grandfather     Other (Cirrhosis) Maternal Aunt          at 69    Diabetes Brother      Immunization History      Immunization History  Administered            Date(s) Administered    >= 3 Yrs, FLUZONE, Pres Free (65200), Influenza Vaccine, Flu Clinic                          10/17/2013      Covid-19 Vaccine Moderna 100 mcg/0.5 ml                          2021      FLU VAC High Dose 65 YRS & Older PRSV Free (44817)                          10/02/2018  2018  10/16/2019                            2020  2021  10/10/2022                            10/18/2023      FLUAD High Dose  65 yr and older (19493)                          09/13/2017 09/16/2020      Fluzone Vaccine Medicare ()                          10/02/2018  10/31/2018  10/16/2019      HEP A                 01/18/2011      HEP B                 01/19/2011      High Dose Fluzone Influenza Vaccine, 65yr+ PF 0.5mL (93891)                          10/17/2014  10/22/2015  09/26/2016                            11/04/2021  10/18/2023      Influenza             10/25/2012      Pneumococcal (Prevnar 13)                          03/16/2016      Pneumovax 23          01/13/2011      Td, Preserv Free      03/12/2015      Zoster Vaccine Live (Zostavax)                          01/01/2010      Zoster Vaccine Recombinant Adjuvanted (Shingrix)                          11/28/2022        SOCIAL HISTORY:   Social History     Socioeconomic History    Marital status:    Occupational History    Occupation: Retired railroad executive, consultant   Tobacco Use    Smoking status: Some Days     Types: Cigars    Smokeless tobacco: Never    Tobacco comments:     2 cigars/week   Vaping Use    Vaping status: Never Used   Substance and Sexual Activity    Alcohol use: Yes     Alcohol/week: 4.0 - 5.0 standard drinks of alcohol     Types: 4 - 5 Standard drinks or equivalent per week     Comment: Cage done 5-6-19    Drug use: No    Sexual activity: Yes   Other Topics Concern    Caffeine Concern Yes    Exercise Yes        REVIEW OF SYSTEMS:   GENERAL: feels well otherwise  SKIN: denies any unusual skin lesions  EYES: denies blurred vision or double vision  HEENT: denies nasal congestion, sinus pain or ST  LUNGS: denies shortness of breath with exertion  CARDIOVASCULAR: denies chest pain on exertion  GI: denies abdominal pain, denies heartburn  : denies nocturia or changes in stream  MUSCULOSKELETAL: denies back pain  NEURO: denies headaches  PSYCHE: denies depression or anxiety  HEMATOLOGIC: denies hx of anemia  ENDOCRINE: + thyroid  history  ALL/ASTHMA: denies hx of allergy or asthma    EXAM:   /70   Pulse 64   Temp 97.1 °F (36.2 °C) (Temporal)   Resp 18   Ht 6' 2\" (1.88 m)   Wt 182 lb 12.8 oz (82.9 kg)   SpO2 98%   BMI 23.47 kg/m²    >   BP Readings from Last 3 Encounters:   08/28/24 118/70   06/04/24 104/62   01/18/24 106/62     GENERAL: well developed, well nourished, in no apparent distress   SKIN: no rashes, no suspicious lesions  HEENT: atraumatic, normocephalic, ears and throat are clear                Hearing Assessed via:  hearing intact b/l   EYES: PERRLA, EOMI, conjunctiva are clear   CHEST: no chest tenderness  LUNGS: clear to auscultation  CARDIO: RRR without murmur  GI: good BS's, no masses, HSM or tenderness  : two descended testicles, no masses, no hernia and no penile lesions  RECTAL: deferred  MUSCULOSKELETAL: back is not tender, FROM of the back  EXTREMITIES: no cyanosis, clubbing or edema  NEURO: Oriented times three, cranial nerves are intact, motor and sensory are grossly intact  FOOT: normal exam    ASSESSMENT AND OTHER RELEVANT CHRONIC CONDITIONS:   Jose Angel Rodriguez is a 79 year old male who presents for a Medicare Assessment.     PLAN SUMMARY:   Diagnoses and all orders for this visit:    1. Routine general medical examination at a health care facility  Labs reviewed with pt   Reminded to complete shingrix #2 - he will complete at the pharmacy     2. Chronic atrial fibrillation (HCC)  Per cardiology   On eliquis     3. Type 2 diabetes mellitus with diabetic neuropathy, without long-term current use of insulin (HCC)  Improved   Continue current meds   Eye exam due 5/2024  F/u here in 5-6 months   - Comp Metabolic Panel (14) [E]; Future  - Hemoglobin A1C [E]; Future    4. Thrombocytopenia (HCC)  Stable   Continue to monitor   - CBC W Differential W Platelet [E]; Future    5. Moderate mitral regurgitation  Per cardiology     6. Dyslipidemia  On statin   CPM   - Lipid Panel [E]; Future    7. Essential  hypertension  Controlled   CPM     8. Nontoxic multinodular goiter  Due for US   - US THYROID (CPT=76536); Future    9. Long term (current) use of anticoagulants  On eliquis for a fib     10. History of skin cancer  Follows with derm at least every 6 months     11. Age-related incipient cataract of right eye  Per ophtho     12. Cigar smoker  Encouraged cessation   Pt not willing to quit          The patient indicates understanding of these issues and agrees to the plan.  The patient is asked to return in 6 months for office visit  Tobacco cessation counseling perfomed  Diet counseling perfomed  Exercise counseling perfomed    SUGGESTED VACCINATIONS - Influenza, Pneumococcal, Zoster, Tetanus   Influenza: No recommendations at this time  Pneumonia: No recommendations at this time

## 2024-09-03 ENCOUNTER — MED REC SCAN ONLY (OUTPATIENT)
Dept: INTERNAL MEDICINE CLINIC | Facility: CLINIC | Age: 79
End: 2024-09-03

## 2024-10-14 DIAGNOSIS — E11.40 TYPE 2 DIABETES MELLITUS WITH DIABETIC NEUROPATHY, WITHOUT LONG-TERM CURRENT USE OF INSULIN (HCC): ICD-10-CM

## 2024-10-16 RX ORDER — GLIPIZIDE 10 MG/1
20 TABLET, FILM COATED, EXTENDED RELEASE ORAL EVERY MORNING
Qty: 180 TABLET | Refills: 3 | Status: SHIPPED | OUTPATIENT
Start: 2024-10-16

## 2024-10-16 NOTE — TELEPHONE ENCOUNTER
Refill passed per [facility] protocol.  Requested Prescriptions   Pending Prescriptions Disp Refills    GLIPIZIDE ER 10 MG Oral Tablet 24 Hr [Pharmacy Med Name: glipizide ER 10 mg tablet, extended release 24 hr] 180 tablet 0     Sig: TAKE TWO TABLETS BY MOUTH EVERY MORNING       Diabetes Medication Protocol Passed - 10/16/2024 12:06 PM        Passed - Last A1C < 7.5 and within past 6 months     Lab Results   Component Value Date    A1C 7.5 (H) 08/22/2024             Passed - In person appointment or virtual visit in the past 6 mos or appointment in next 3 mos     Recent Outpatient Visits              1 month ago Routine general medical examination at a health care facility    [address] Latia Newton PA-C    Office Visit    3 months ago     [facility]    Office Visit    3 months ago     [facility]    Office Visit    3 months ago DDD (degenerative disc disease), lumbar    [facility]    Office Visit    4 months ago Left sided sciatica    [address] Kathy Mayer MD    Office Visit          Future Appointments         Provider Department Appt Notes    In 6 days [facility] NURSE [address] flu vaccine                    Passed - Microalbumin procedure in past 12 months or taking ACE/ARB        Passed - EGFRCR or GFRNAA > 50     GFR Evaluation  EGFRCR: 70 , resulted on 8/22/2024          Passed - GFR in the past 12 months          JARDIANCE 25 MG Oral Tab [Pharmacy Med Name: Jardiance 25 mg tablet] 90 tablet 0     Sig: TAKE ONE TABLET BY MOUTH DAILY       Diabetes Medication Protocol Passed - 10/16/2024 12:06 PM        Passed - Last A1C < 7.5 and within past 6 months     Lab Results   Component Value Date    A1C 7.5 (H)  08/22/2024             Passed - In person appointment or virtual visit in the past 6 mos or appointment in next 3 mos     Recent Outpatient Visits              1 month ago Routine general medical examination at a health care facility    [address] Latia Rangel PA-C    Office Visit    3 months ago     [facility]    Office Visit    3 months ago     [facility]    Office Visit    3 months ago DDD (degenerative disc disease), lumbar    [facility]    Office Visit    4 months ago Left sided sciatica    [address]    Office Visit          Future Appointments         Provider Department Appt Notes    In 6 days [facility] NURSE [address] flu vaccine                    Passed - Microalbumin procedure in past 12 months or taking ACE/ARB        Passed - EGFRCR or GFRNAA > 50     GFR Evaluation  EGFRCR: 70 , resulted on 8/22/2024          Passed - GFR in the past 12 months           Future Appointments         Provider Department Appt Notes    In 6 days [facility] NURSE [address] flu vaccine          Recent Outpatient Visits              1 month ago Routine general medical examination at a health care facility    [address] Latia Rangel PA-C    Office Visit    3 months ago     [facility]    Office Visit    3 months ago     [facility]    Office Visit    3 months ago DDD (degenerative disc disease), lumbar    [facility]    Office Visit    4 months ago Left sided sciatica     [facility], [address] [place] [address] Kathy Mayer MD    Office Visit

## 2024-10-22 ENCOUNTER — IMMUNIZATION (OUTPATIENT)
Dept: INTERNAL MEDICINE CLINIC | Facility: CLINIC | Age: 79
End: 2024-10-22
Payer: MEDICARE

## 2024-10-22 DIAGNOSIS — Z23 NEED FOR VACCINATION: Primary | ICD-10-CM

## 2024-10-22 PROCEDURE — G0008 ADMIN INFLUENZA VIRUS VAC: HCPCS | Performed by: INTERNAL MEDICINE

## 2024-10-22 PROCEDURE — 90662 IIV NO PRSV INCREASED AG IM: CPT | Performed by: INTERNAL MEDICINE

## 2025-01-03 DIAGNOSIS — N52.9 ERECTILE DYSFUNCTION, UNSPECIFIED ERECTILE DYSFUNCTION TYPE: ICD-10-CM

## 2025-01-08 RX ORDER — SILDENAFIL 50 MG/1
50 TABLET, FILM COATED ORAL
Qty: 10 TABLET | Refills: 3 | Status: SHIPPED | OUTPATIENT
Start: 2025-01-08

## 2025-01-08 NOTE — TELEPHONE ENCOUNTER
Refill passed per [facility] protocol.    Requested Prescriptions   Pending Prescriptions Disp Refills    SILDENAFIL CITRATE 50 MG Oral Tab [Pharmacy Med Name: sildenafil 50 mg tablet] 10 tablet 3     Sig: Take 1 tablet (50 mg total) by mouth daily as needed for Erectile Dysfunction.       Genitourinary Medications Passed - 1/8/2025 10:56 AM        Passed - Patient does not have pulmonary hypertension on problem list        Passed - In person appointment or virtual visit in the past 12 mos or appointment in next 3 mos     Recent Outpatient Visits              4 months ago Routine general medical examination at a health care facility    [facility], [address] [place], [place] [address]Latia PA-C    Office Visit    6 months ago     [facility]    Office Visit    6 months ago     [facility]    Office Visit    6 months ago DDD (degenerative disc disease), lumbar    [facility]    Office Visit    7 months ago Left sided sciatica    [address] Kathy Mayer MD    Office Visit                      Passed - Medication is active on med list             Recent Outpatient Visits              4 months ago Routine general medical examination at a health care facility    [facility], [address] [place] [address] Latia Newton PA-C    Office Visit    6 months ago     [facility]    Office Visit    6 months ago     [facility]    Office Visit    6 months ago DDD (degenerative disc disease), lumbar    [facility]    Office Visit    7 months ago Left sided sciatica    [address]  Kathy Corona MD    Office Visit

## 2025-01-24 DIAGNOSIS — E78.5 DYSLIPIDEMIA: ICD-10-CM

## 2025-01-24 NOTE — TELEPHONE ENCOUNTER
Please review. Protocol Failed; No Protocol    Patient is leaving for vacation , sending high priority. Patient is out of medication.    Message sent to patient to complete labs     Requested Prescriptions   Pending Prescriptions Disp Refills    rosuvastatin 20 MG Oral Tab 90 tablet 3     Sig: TAKE ONE TABLET BY MOUTH DAILY       Cholesterol Medication Protocol Failed - 1/24/2025  3:59 PM        Failed - ALT < 80     Lab Results   Component Value Date    ALT  01/22/2024      Comment:      Due to  backorder we are temporarily unable to offer hospital-based ALT testing at [place] [facility].   If urgently needed, please order ALT test code [id number].   The new order will need a new venipuncture and will be sent to [facility] for testing.   The expected turnaround time will be within 24 hours.              Failed - ALT resulted within past year        Failed - Lipid panel within past 12 months     Lab Results   Component Value Date    CHOLEST 146 01/22/2024    TRIG 128 01/22/2024    HDL 49 01/22/2024    LDL 74 01/22/2024    VLDL 20 01/22/2024    TCHDLRATIO 4.44 04/25/2018    NONHDLC 97 01/22/2024             Passed - In person appointment or virtual visit in the past 12 mos or appointment in next 3 mos     Recent Outpatient Visits              4 months ago Routine general medical examination at a health care facility    [facility], [address] [place] [address] Latia Newton PA-C    Office Visit    7 months ago     [facility]    Office Visit    7 months ago     [facility] Y[facility] Mcguire, PT    Office Visit    7 months ago DDD (degenerative disc disease), lumbar    [facility] [place]    Office Visit    7 months ago Left sided sciatica    [facility], [address] [place] [address] Kathy Mayer MD    Office Visit                      Passed - Medication  is active on med list                 Recent Outpatient Visits              4 months ago Routine general medical examination at a health care facility    [facility], [address] [place] [address] Latia Newton PA-C    Office Visit    7 months ago     [facility]    Office Visit    7 months ago     [facility] Yaw Mcguire, PT    Office Visit    7 months ago DDD (degenerative disc disease), lumbar    [facility] Mcguire, PT    Office Visit    7 months ago Left sided sciatica    [facility], [address] [place] [address] Kathy Mayer MD    Office Visit

## 2025-01-24 NOTE — TELEPHONE ENCOUNTER
Jose Angel Rodriguez requesting Medication Refill for:    Medication name and dose (copy and paste from medication list):   Medication Quantity Refills Start End   rosuvastatin 20 MG Oral Tab 90 tablet 3 1/24/2024 --   Sig:   TAKE ONE TABLET BY MOUTH DAILY     Route:   (none)     Order #:   [id number]         If medication is not on medication list - transfer patient to RN queue for triage    Preferred Pharmacy:   [facility] - [place] [address] [phone], [phone]   [address] [facility] [address]   Phone: [phone] Fax: [phone]   Hours: Not open 24 hours       LOV: 6/4/2024   Last Refill date:  Next Scheduled appointment:     Patient is going on vacation and is currently out of this medication

## 2025-01-27 RX ORDER — ROSUVASTATIN CALCIUM 20 MG/1
TABLET, COATED ORAL
Qty: 90 TABLET | Refills: 0 | Status: SHIPPED | OUTPATIENT
Start: 2025-01-27

## 2025-01-29 NOTE — TELEPHONE ENCOUNTER
MCM not read.     Called and spoke with pt. Notified pt refill sent. Notified overdue for labs and orders already in place. Pt stated he is on his way to [facility] and will be there for one month. Pt agreeable to complete labs when he is back.

## 2025-03-05 ENCOUNTER — LAB ENCOUNTER (OUTPATIENT)
Dept: LAB | Age: 80
End: 2025-03-05
Attending: PHYSICIAN ASSISTANT
Payer: MEDICARE

## 2025-03-05 DIAGNOSIS — D69.6 THROMBOCYTOPENIA: ICD-10-CM

## 2025-03-05 DIAGNOSIS — E78.5 DYSLIPIDEMIA: ICD-10-CM

## 2025-03-05 DIAGNOSIS — E11.40 TYPE 2 DIABETES MELLITUS WITH DIABETIC NEUROPATHY, WITHOUT LONG-TERM CURRENT USE OF INSULIN (HCC): ICD-10-CM

## 2025-03-05 LAB
ALBUMIN SERPL-MCNC: 4.9 G/DL (ref 3.2–4.8)
ALBUMIN/GLOB SERPL: 2 {RATIO} (ref 1–2)
ALP LIVER SERPL-CCNC: 54 U/L
ALT SERPL-CCNC: 22 U/L
ANION GAP SERPL CALC-SCNC: 9 MMOL/L (ref 0–18)
AST SERPL-CCNC: 18 U/L (ref ?–34)
BASOPHILS # BLD AUTO: 0.05 X10(3) UL (ref 0–0.2)
BASOPHILS NFR BLD AUTO: 0.6 %
BILIRUB SERPL-MCNC: 0.5 MG/DL (ref 0.2–1.1)
BUN BLD-MCNC: 17 MG/DL (ref 9–23)
CALCIUM BLD-MCNC: 9.7 MG/DL (ref 8.7–10.6)
CHLORIDE SERPL-SCNC: 102 MMOL/L (ref 98–112)
CHOLEST SERPL-MCNC: 162 MG/DL (ref ?–200)
CO2 SERPL-SCNC: 30 MMOL/L (ref 21–32)
CREAT BLD-MCNC: 1.13 MG/DL
EGFRCR SERPLBLD CKD-EPI 2021: 66 ML/MIN/1.73M2 (ref 60–?)
EOSINOPHIL # BLD AUTO: 0.35 X10(3) UL (ref 0–0.7)
EOSINOPHIL NFR BLD AUTO: 4.2 %
ERYTHROCYTE [DISTWIDTH] IN BLOOD BY AUTOMATED COUNT: 12.5 %
EST. AVERAGE GLUCOSE BLD GHB EST-MCNC: 209 MG/DL (ref 68–126)
FASTING PATIENT LIPID ANSWER: YES
FASTING STATUS PATIENT QL REPORTED: YES
GLOBULIN PLAS-MCNC: 2.4 G/DL (ref 2–3.5)
GLUCOSE BLD-MCNC: 194 MG/DL (ref 70–99)
HBA1C MFR BLD: 8.9 % (ref ?–5.7)
HCT VFR BLD AUTO: 49.3 %
HDLC SERPL-MCNC: 44 MG/DL (ref 40–59)
HGB BLD-MCNC: 16.4 G/DL
IMM GRANULOCYTES # BLD AUTO: 0.04 X10(3) UL (ref 0–1)
IMM GRANULOCYTES NFR BLD: 0.5 %
LDLC SERPL CALC-MCNC: 100 MG/DL (ref ?–100)
LYMPHOCYTES # BLD AUTO: 1.91 X10(3) UL (ref 1–4)
LYMPHOCYTES NFR BLD AUTO: 22.7 %
MCH RBC QN AUTO: 31.7 PG (ref 26–34)
MCHC RBC AUTO-ENTMCNC: 33.3 G/DL (ref 31–37)
MCV RBC AUTO: 95.4 FL
MONOCYTES # BLD AUTO: 0.59 X10(3) UL (ref 0.1–1)
MONOCYTES NFR BLD AUTO: 7 %
NEUTROPHILS # BLD AUTO: 5.46 X10 (3) UL (ref 1.5–7.7)
NEUTROPHILS # BLD AUTO: 5.46 X10(3) UL (ref 1.5–7.7)
NEUTROPHILS NFR BLD AUTO: 65 %
NONHDLC SERPL-MCNC: 118 MG/DL (ref ?–130)
OSMOLALITY SERPL CALC.SUM OF ELEC: 299 MOSM/KG (ref 275–295)
PLATELET # BLD AUTO: 171 10(3)UL (ref 150–450)
POTASSIUM SERPL-SCNC: 4.6 MMOL/L (ref 3.5–5.1)
PROT SERPL-MCNC: 7.3 G/DL (ref 5.7–8.2)
RBC # BLD AUTO: 5.17 X10(6)UL
SODIUM SERPL-SCNC: 141 MMOL/L (ref 136–145)
TRIGL SERPL-MCNC: 98 MG/DL (ref 30–149)
VLDLC SERPL CALC-MCNC: 16 MG/DL (ref 0–30)
WBC # BLD AUTO: 8.4 X10(3) UL (ref 4–11)

## 2025-03-05 PROCEDURE — 85025 COMPLETE CBC W/AUTO DIFF WBC: CPT

## 2025-03-05 PROCEDURE — 80053 COMPREHEN METABOLIC PANEL: CPT

## 2025-03-05 PROCEDURE — 36415 COLL VENOUS BLD VENIPUNCTURE: CPT

## 2025-03-05 PROCEDURE — 83036 HEMOGLOBIN GLYCOSYLATED A1C: CPT

## 2025-03-05 PROCEDURE — 80061 LIPID PANEL: CPT

## 2025-03-10 DIAGNOSIS — E11.40 TYPE 2 DIABETES MELLITUS WITH DIABETIC NEUROPATHY, WITHOUT LONG-TERM CURRENT USE OF INSULIN (HCC): ICD-10-CM

## 2025-03-12 RX ORDER — SITAGLIPTIN AND METFORMIN HYDROCHLORIDE 1000; 50 MG/1; MG/1
TABLET, FILM COATED ORAL
Qty: 180 TABLET | Refills: 3 | Status: SHIPPED | OUTPATIENT
Start: 2025-03-12

## 2025-03-12 NOTE — TELEPHONE ENCOUNTER
Please Review. Protocol Failed; No Protocol     Requested Prescriptions   Pending Prescriptions Disp Refills    JANUMET  MG Oral Tab [Pharmacy Med Name: Janumet 50 mg-1,000 mg tablet] 180 tablet 3     Sig: TAKE ONE TABLET BY MOUTH TWICE DAILY with meals       Diabetes Medication Protocol Failed - 3/12/2025  3:45 PM        Failed - Last A1C < 7.5 and within past 6 months     Lab Results   Component Value Date    A1C 8.9 (H) 03/05/2025

## 2025-05-01 ENCOUNTER — OFFICE VISIT (OUTPATIENT)
Dept: INTERNAL MEDICINE CLINIC | Facility: CLINIC | Age: 80
End: 2025-05-01
Payer: MEDICARE

## 2025-05-01 ENCOUNTER — TELEPHONE (OUTPATIENT)
Dept: INTERNAL MEDICINE CLINIC | Facility: CLINIC | Age: 80
End: 2025-05-01

## 2025-05-01 VITALS
TEMPERATURE: 97 F | DIASTOLIC BLOOD PRESSURE: 68 MMHG | HEIGHT: 74 IN | SYSTOLIC BLOOD PRESSURE: 114 MMHG | RESPIRATION RATE: 18 BRPM | HEART RATE: 79 BPM | OXYGEN SATURATION: 97 % | BODY MASS INDEX: 24.67 KG/M2 | WEIGHT: 192.19 LBS

## 2025-05-01 DIAGNOSIS — E78.5 DYSLIPIDEMIA: ICD-10-CM

## 2025-05-01 DIAGNOSIS — D69.6 THROMBOCYTOPENIA: ICD-10-CM

## 2025-05-01 DIAGNOSIS — I10 ESSENTIAL HYPERTENSION: ICD-10-CM

## 2025-05-01 DIAGNOSIS — E04.2 NONTOXIC MULTINODULAR GOITER: ICD-10-CM

## 2025-05-01 DIAGNOSIS — E11.40 TYPE 2 DIABETES MELLITUS WITH DIABETIC NEUROPATHY, WITHOUT LONG-TERM CURRENT USE OF INSULIN (HCC): Primary | ICD-10-CM

## 2025-05-01 PROCEDURE — G2211 COMPLEX E/M VISIT ADD ON: HCPCS | Performed by: PHYSICIAN ASSISTANT

## 2025-05-01 PROCEDURE — 99214 OFFICE O/P EST MOD 30 MIN: CPT | Performed by: PHYSICIAN ASSISTANT

## 2025-05-01 RX ORDER — APIXABAN 5 MG/1
5 TABLET, FILM COATED ORAL 2 TIMES DAILY
COMMUNITY

## 2025-05-01 RX ORDER — SEMAGLUTIDE 0.68 MG/ML
0.25 INJECTION, SOLUTION SUBCUTANEOUS WEEKLY
Qty: 3 ML | Refills: 0 | Status: SHIPPED | OUTPATIENT
Start: 2025-05-01

## 2025-05-01 NOTE — PATIENT INSTRUCTIONS
Stop janumet   Start metformin 1000 mg twice daily   Start ozempic 0.25 mg once weekly   Continue monitoring your home blood sugar readings  Call me in 1 month with an update on how you are tolerating the ozempic and if doing well, then we will increase the dose   See me in 3 months for your physical and complete labs prior to the visit   Schedule your eye exam   Schedule your thyroid ultrasound

## 2025-05-01 NOTE — TELEPHONE ENCOUNTER
Triage call transferred.   Spoke with Rhoda pharmacy tech at [facility] f/u on pt's DX code for meds sent today.  Provided dx: Type 2 diabetes mellitus with diabetic neuropathy, without long-term current use of insulin (HCC) [E11.40]     Nothing further required. Pharmacy verbalized understanding.

## 2025-05-01 NOTE — PROGRESS NOTES
Jose Angel Rodriguez is a 80 year old male.   No chief complaint on file.    HPI:      History of Present Illness  Nino Rodriguez is an 80 year old male with diabetes who presents for management of elevated A1c levels.    He has a history of diabetes with fluctuating A1c levels between 7.5 and 8.1 over the past three years, recently increasing to 8.9. Currently taking jardiance 25 mg daily, glipizide 20 mg daily, janumet  mg bid. Tolerating meds well.    He experiences numbness and tingling in his feet, consistent with diabetic neuropathy. He describes the sensation as 'coming and going' and has a history of a severe foot infection requiring hospitalization and intravenous antibiotics.          Allergies:  Allergies[1]   Current Meds:  Current Medications[2]     PMH:   Past Medical History[3]    ROS:   GENERAL: Negative for fever, chills and fatigue. NAD.  HENT: Negative for congestion, sore throat, and ear pain.  RESPIRATORY: Negative for cough, chest tightness, shortness of breath and wheezing.    CV: Negative for chest pain, palpitations and leg swelling.   GI: Negative for nausea, vomiting, abdominal pain, diarrhea, and blood in stool.   : Negative for dysuria, hematuria and difficulty urinating.   MUSCULOSKELETAL: Negative for myalgias, back pain, joint swelling, arthralgias and gait problem.   NEURO: Negative for dizziness, syncope, weakness, numbness, tingling and headaches.   PSYCH: The patient is not nervous/anxious. No depression.      PHYSICAL EXAM:    /68   Pulse 79   Temp 96.9 °F (36.1 °C) (Temporal)   Resp 18   Ht 6' 2\" (1.88 m)   Wt 192 lb 3.2 oz (87.2 kg)   SpO2 97%   BMI 24.68 kg/m²     GENERAL: NAD. A&Ox3  RESPIRATORY: CTAB, no R/R/W  CV: RRR, no murmurs.   LE: Bilateral barefoot skin diabetic exam is abnormal with diabetic monofilament/sensation testing abnormal and dystrophic nails and/or dry skin  PSYCH: Appropriate mood and affect.      ASSESSMENT/ PLAN:        Health Maintenance  Due   Topic Date Due    Zoster Vaccines (3 of 3) 01/23/2023    COVID-19 Vaccine (4 - 2024-25 season) 09/01/2024    Annual Depression Screening  01/01/2025    Fall Risk Screening (Annual)  01/01/2025    Diabetes Care: Foot Exam (Annual)  01/01/2025    Tobacco Cessation Counseling  01/01/2025    Diabetes Care: Microalb/Creat Ratio (Annual)  01/01/2025    Diabetes Care Dilated Eye Exam  05/17/2025       Assessment & Plan  Type 2 diabetes mellitus with hyperglycemia  A1c at 8.9 indicates poor control. Plan to switch Janumet to metformin and add Ozempic for better glycemic management. Discussed Ozempic's benefits and side effects. Consider reducing glipizide if Ozempic is effective.  - Discontinue Janumet, start metformin.  - Add Ozempic 0.25 mg weekly, increase to 0.5 mg after one month.  - Continue Jardiance and glipizide.  - Monitor home blood glucose  - Contact provider in one month to report Ozempic's effects.  - Follow-up in three months for diabetes management reassessment.    Diabetic neuropathy  Decreased foot sensation consistent with neuropathy. Emphasized foot monitoring due to past severe infection.  - Perform foot exam today.  - Instruct to monitor feet for sores or injuries, report immediately.          Pt indicates understanding and agrees to the plan.     No follow-ups on file.    Latia Newton PA-C         The following individual(s) verbally consented to be recorded using ambient AI listening technology and understand that they can each withdraw their consent to this listening technology at any point by asking the clinician to turn off or pause the recording:    Patient name: Jose Angel POWELL Jennifer  Additional names:  n/a                 [1] No Known Allergies  [2]   Current Outpatient Medications   Medication Sig Dispense Refill    SITagliptin-metFORMIN HCl (JANUMET)  MG Oral Tab TAKE ONE TABLET BY MOUTH TWICE DAILY with meals 180 tablet 3    rosuvastatin 20 MG Oral Tab TAKE ONE TABLET BY MOUTH  DAILY 90 tablet 0    Sildenafil Citrate 50 MG Oral Tab Take 1 tablet (50 mg total) by mouth daily as needed for Erectile Dysfunction. 10 tablet 3    glipiZIDE ER 10 MG Oral Tablet 24 Hr Take 2 tablets (20 mg total) by mouth every morning. 180 tablet 3    empagliflozin (JARDIANCE) 25 MG Oral Tab Take 1 tablet (25 mg total) by mouth daily. 90 tablet 3    traMADol 50 MG Oral Tab Take 1 tablet (50 mg total) by mouth 2 (two) times daily as needed for Pain. 30 tablet 0    dilTIAZem  MG Oral Capsule SR 24 Hr Take 1 capsule (120 mg total) by mouth daily.      methylPREDNISolone (MEDROL) 4 MG Oral Tablet Therapy Pack As directed. 1 each 0    Calcipotriene 0.005 % External Ointment Apply topically.      fluorouracil 5 % External Cream Apply topically.      ENALAPRIL 2.5 MG Oral Tab TAKE 1 TABLET (2.5 MG TOTAL) BY MOUTH DAILY. 90 tablet 1    METOPROLOL SUCCINATE 100 MG Oral Tablet 24 Hr TAKE 1/2 TABLET BY MOUTH DAILY 45 tablet 1    Clobetasol Propionate (TEMOVATE) 0.05 % Apply Externally Ointment Apply 1 Application topically as needed.  0    Cholecalciferol (VITAMIN D) 1000 UNITS Oral Tab Take 2 tablets by mouth daily.      MULTIVITAMIN OR 1 tab po daily      VITAMIN C OR 500mg po daily      GLUCOSAMINE CHONDROITIN ADV OR 1 tab po daily      ELIQUIS 5 MG Oral Tab Take 1 tablet (5 mg total) by mouth 2 (two) times daily.     [3]   Past Medical History:   Atrial fibrillation (HCC)    Basal cell carcinoma    Basal cell carcinoma of chin    Cellulitis of right lower extremity    Diabetes mellitus (HCC)    Diabetic foot ulcer (HCC)    Goiter    Heart palpitations    History of skin cancer    Moderate mitral regurgitation    Skin cancer    Stopped smoking with greater than 40 pack year history

## 2025-05-08 DIAGNOSIS — E78.5 DYSLIPIDEMIA: ICD-10-CM

## 2025-05-08 RX ORDER — ROSUVASTATIN CALCIUM 20 MG/1
20 TABLET, COATED ORAL DAILY
Qty: 90 TABLET | Refills: 3 | Status: SHIPPED | OUTPATIENT
Start: 2025-05-08

## 2025-05-08 NOTE — TELEPHONE ENCOUNTER
Refill passes per [facility] protocol.    No future appointments with primary care medicine.

## 2025-05-27 ENCOUNTER — TELEPHONE (OUTPATIENT)
Dept: INTERNAL MEDICINE CLINIC | Facility: CLINIC | Age: 80
End: 2025-05-27

## 2025-06-03 ENCOUNTER — TELEPHONE (OUTPATIENT)
Dept: INTERNAL MEDICINE CLINIC | Facility: CLINIC | Age: 80
End: 2025-06-03

## 2025-06-03 RX ORDER — SEMAGLUTIDE 0.68 MG/ML
0.5 INJECTION, SOLUTION SUBCUTANEOUS WEEKLY
Qty: 9 ML | Refills: 0 | Status: SHIPPED | OUTPATIENT
Start: 2025-06-03

## 2025-06-03 NOTE — TELEPHONE ENCOUNTER
LIZZ Fontana- pt requested refill of Ozempic, pended, please submit if OK.  Denies side effects.    Pt called for condition update as requested at LOV.  States 0.5mg dose of Ozempic is working well, he has one week left before needing a refill.  Requested to stay at current dose.  Has lost 4lbs, is happy to be in the 140lbs.  Denies abdominal pain, nausea, vomiting, other side effects.    5/1/25 LOV note:    Assessment & Plan  Type 2 diabetes mellitus with hyperglycemia  A1c at 8.9 indicates poor control. Plan to switch Janumet to metformin and add Ozempic for better glycemic management. Discussed Ozempic's benefits and side effects. Consider reducing glipizide if Ozempic is effective.  - Discontinue Janumet, start metformin.  - Add Ozempic 0.25 mg weekly, increase to 0.5 mg after one month.  - Continue Jardiance and glipizide.  - Monitor home blood glucose  - Contact provider in one month to report Ozempic's effects.  - Follow-up in three months for diabetes management reassessment.

## 2025-06-03 NOTE — TELEPHONE ENCOUNTER
Attempted to call patient on home and cell. Home number no answer, unable to leave message, cell would ring and then get busy signal. LiveBid message sent to patient.

## 2025-06-03 NOTE — TELEPHONE ENCOUNTER
Med refilled.     Future Appointments   Date Time Provider Department Center   7/21/2025  1:30 PM Portillo Hernandez APRN [id number]     Due for ov and labs 8/2025. Please advise to schedule.

## 2025-06-10 NOTE — TELEPHONE ENCOUNTER
RN to pharmacy call, per pharmacist pt has not yet picked up prescription.  RN to pt call, notified prescription is ready for pickup and need for OV/labs in August, pt verbalized understanding. RN offered to schedule pt but he was on the golf course at time of call, states he will callback office to schedule for August.

## 2025-06-30 ENCOUNTER — TELEPHONE (OUTPATIENT)
Dept: INTERNAL MEDICINE CLINIC | Facility: CLINIC | Age: 80
End: 2025-06-30

## 2025-06-30 NOTE — TELEPHONE ENCOUNTER
Orders to    [facility]         Pt aware to get labs done no sooner than 2 weeks prior to the appt.  Pt aware to fast.  No call back required.      Future Appointments   Date Time Provider Department Center   7/7/2025 10:20 AM Latia Newton PA-C [facility]   7/21/2025  1:30 PM Portillo Hernandez APRN [id number]   8/25/2025  8:00 AM Latia Newton PA-C [facility]

## 2025-07-07 ENCOUNTER — OFFICE VISIT (OUTPATIENT)
Dept: INTERNAL MEDICINE CLINIC | Facility: CLINIC | Age: 80
End: 2025-07-07
Payer: MEDICARE

## 2025-07-07 VITALS
RESPIRATION RATE: 16 BRPM | HEIGHT: 74 IN | SYSTOLIC BLOOD PRESSURE: 116 MMHG | DIASTOLIC BLOOD PRESSURE: 70 MMHG | BODY MASS INDEX: 23.15 KG/M2 | WEIGHT: 180.38 LBS | TEMPERATURE: 97 F | HEART RATE: 67 BPM | OXYGEN SATURATION: 97 %

## 2025-07-07 DIAGNOSIS — E78.5 DYSLIPIDEMIA: ICD-10-CM

## 2025-07-07 DIAGNOSIS — E11.40 TYPE 2 DIABETES MELLITUS WITH DIABETIC NEUROPATHY, WITHOUT LONG-TERM CURRENT USE OF INSULIN (HCC): Primary | ICD-10-CM

## 2025-07-07 DIAGNOSIS — I10 ESSENTIAL HYPERTENSION: ICD-10-CM

## 2025-07-07 LAB — HEMOGLOBIN A1C: 7.6 % (ref 4.3–5.6)

## 2025-07-07 PROCEDURE — 99214 OFFICE O/P EST MOD 30 MIN: CPT | Performed by: PHYSICIAN ASSISTANT

## 2025-07-07 PROCEDURE — G2211 COMPLEX E/M VISIT ADD ON: HCPCS | Performed by: PHYSICIAN ASSISTANT

## 2025-07-07 NOTE — PROGRESS NOTES
Jose Angel Rodriguez is a 80 year old male.   Chief Complaint   Patient presents with    Medication Follow-Up     EJ Rm 2- Pt is here for medication f/u     HPI:      History of Present Illness  The patient presents for follow-up of diabetes management.    He has been on Ozempic for three to four months, currently at a dose of 0.5 mg, with no major side effects or problems with the injections. Since starting Ozempic, he has lost approximately 12 pounds. His blood glucose readings at home have improved, now running in the 140s to 150s, compared to previous readings of 170s to 180s. His last A1c was 8.9, and today it has improved to 7.6. He is also taking metformin 1000 mg bid, jardiance 25 mg daily, and glipizide 20 mg daily.         Allergies:  Allergies[1]   Current Meds:  Current Medications[2]     PMH:   Past Medical History[3]    ROS:   GENERAL: Negative for fever, chills and fatigue. NAD.  HENT: Negative for congestion, sore throat, and ear pain.  RESPIRATORY: Negative for cough, chest tightness, shortness of breath and wheezing.    CV: Negative for chest pain, palpitations and leg swelling.   GI: Negative for nausea, vomiting, abdominal pain, diarrhea, and blood in stool.   : Negative for dysuria, hematuria and difficulty urinating.   MUSCULOSKELETAL: Negative for myalgias, back pain, joint swelling, arthralgias and gait problem.   NEURO: Negative for dizziness, syncope, weakness, numbness, tingling and headaches.   PSYCH: The patient is not nervous/anxious. No depression.      PHYSICAL EXAM:    /70   Pulse 67   Temp 96.9 °F (36.1 °C) (Temporal)   Resp 16   Ht 6' 2\" (1.88 m)   Wt 180 lb 6.4 oz (81.8 kg)   SpO2 97%   BMI 23.16 kg/m²     GENERAL: NAD. A&Ox3  RESPIRATORY: CTAB, no R/R/W  CV: RRR, no murmurs.   PSYCH: Appropriate mood and affect.      ASSESSMENT/ PLAN:   1. Type 2 diabetes mellitus with diabetic neuropathy, without long-term current use of insulin (HCC)  A1c shows good improvement    Continue current meds   Recheck A1c again in another 3 months  Due for fasting labs - he will complete prior to his upcoming AWV  He has appt with podiatry soon for his neuropathy     2. Dyslipidemia  Will complete labs within the next month    3. Essential hypertension  Well controlled CPM        Health Maintenance Due   Topic Date Due    Zoster Vaccines (3 of 3) 01/23/2023    COVID-19 Vaccine (4 - 2024-25 season) 09/01/2024    Tobacco Cessation Counseling  01/01/2025    Diabetes Care: Microalb/Creat Ratio (Annual)  01/01/2025    Diabetes Care A1C  06/05/2025    Annual Physical  08/28/2025               Pt indicates understanding and agrees to the plan.     No follow-ups on file.    Latia Newton PA-C         The following individual(s) verbally consented to be recorded using ambient AI listening technology and understand that they can each withdraw their consent to this listening technology at any point by asking the clinician to turn off or pause the recording:    Patient name: Jose Angel POWELL Jennifer  Additional names:  n/a                 [1] No Known Allergies  [2]   Current Outpatient Medications   Medication Sig Dispense Refill    semaglutide (OZEMPIC, 0.25 OR 0.5 MG/DOSE,) 2 MG/3ML Subcutaneous Solution Pen-injector Inject 0.5 mg into the skin once a week. 9 mL 0    rosuvastatin 20 MG Oral Tab Take 1 tablet (20 mg total) by mouth daily. 90 tablet 3    ELIQUIS 5 MG Oral Tab Take 1 tablet (5 mg total) by mouth 2 (two) times daily.      metFORMIN HCl 1000 MG Oral Tab Take 1 tablet (1,000 mg total) by mouth 2 (two) times daily with meals. 180 tablet 3    Sildenafil Citrate 50 MG Oral Tab Take 1 tablet (50 mg total) by mouth daily as needed for Erectile Dysfunction. 10 tablet 3    glipiZIDE ER 10 MG Oral Tablet 24 Hr Take 2 tablets (20 mg total) by mouth every morning. 180 tablet 3    empagliflozin (JARDIANCE) 25 MG Oral Tab Take 1 tablet (25 mg total) by mouth daily. 90 tablet 3    traMADol 50 MG Oral Tab Take 1  tablet (50 mg total) by mouth 2 (two) times daily as needed for Pain. 30 tablet 0    dilTIAZem  MG Oral Capsule SR 24 Hr Take 1 capsule (120 mg total) by mouth daily.      methylPREDNISolone (MEDROL) 4 MG Oral Tablet Therapy Pack As directed. 1 each 0    Calcipotriene 0.005 % External Ointment Apply topically.      fluorouracil 5 % External Cream Apply topically.      ENALAPRIL 2.5 MG Oral Tab TAKE 1 TABLET (2.5 MG TOTAL) BY MOUTH DAILY. 90 tablet 1    METOPROLOL SUCCINATE 100 MG Oral Tablet 24 Hr TAKE 1/2 TABLET BY MOUTH DAILY 45 tablet 1    Clobetasol Propionate (TEMOVATE) 0.05 % Apply Externally Ointment Apply 1 Application topically as needed.  0    Cholecalciferol (VITAMIN D) 1000 UNITS Oral Tab Take 2 tablets by mouth daily.      MULTIVITAMIN OR 1 tab po daily      VITAMIN C OR 500mg po daily      GLUCOSAMINE CHONDROITIN ADV OR 1 tab po daily     [3]   Past Medical History:   Atrial fibrillation (HCC)    Basal cell carcinoma    Basal cell carcinoma of chin    Cellulitis of right lower extremity    Diabetes mellitus (HCC)    Diabetic foot ulcer (HCC)    Goiter    Heart palpitations    History of skin cancer    Moderate mitral regurgitation    Skin cancer    Stopped smoking with greater than 40 pack year history

## 2025-07-21 ENCOUNTER — OFFICE VISIT (OUTPATIENT)
Dept: PODIATRY CLINIC | Facility: CLINIC | Age: 80
End: 2025-07-21

## 2025-07-21 DIAGNOSIS — B35.1 ONYCHOMYCOSIS: ICD-10-CM

## 2025-07-21 DIAGNOSIS — E11.40 TYPE 2 DIABETES MELLITUS WITH DIABETIC NEUROPATHY, WITHOUT LONG-TERM CURRENT USE OF INSULIN (HCC): ICD-10-CM

## 2025-07-21 DIAGNOSIS — L60.3 NAIL DYSTROPHY: Primary | ICD-10-CM

## 2025-07-21 DIAGNOSIS — L60.8 DISCOLORATION AND THICKENING OF NAILS BOTH FEET: ICD-10-CM

## 2025-07-22 NOTE — PROGRESS NOTES
[facility]  Progress Note      Jose Angel Rodriguez is a 80 year old male.   Chief Complaint   Patient presents with    Diabetic Foot Care     Nail care and foot check- fbg 155- A1c 7.6 7/7- pcp lov 7/7 Latia Newton PA-C         HPI:       Jose Angel Rodriguez is a pleasant 80 year old male who presents to the clinic today with his wife for diabetic foot exam and routine nail care. Pt complains of elongated, thickened, and incurvated nails and has difficulty trimming on his own.  He denies any open sores to feet.  Patient makes mention that he used to go to a podiatrist many years ago for wounds to his feet that took \"a long time to heal\".  Patient and wife mention that since then they are very vigilant about checking his feet to ensure that there are no wounds.  Last hemoglobin A1c 7.6 on 7/7/2025.  Past medical history, medications, allergies reviewed.       Allergies: Patient has no known allergies.   Current Medications[1]   Past Medical History[2]   Past Surgical History[3]   Family History[4]   Social Hx on file[5]        REVIEW OF SYSTEMS:     10 point ROS completed and was negative, except for pertinent positive and negatives stated in subjective.       EXAM:       GENERAL: well developed, well nourished, in no apparent distress  EXTREMITIES:  1. Integument: The patient's nails appear incurvated, thickened, elongated, dystrophic, discolored with subungual debris 1-5 right, 1-5 left nails. Skin appears moist, warm, and supple with positive hair growth. There are no color changes. No open lesions. No macerations. No Hyperkeratotic lesions.   2. Vascular: Dorsalis pedis 2/4 bilateral and posterior tibial pulses 2/4 bilateral, capillary refill normal.  3. Neurological: Gross sensation intact via light touch bilaterally.  Normal sharp/dull sensation.  Decreased protective sensation to bilateral lower extremities.   4. Musculoskeletal: All muscle groups are graded 5/5 in the foot and  ankle.       ASSESSMENT AND PLAN:   Diagnoses and all orders for this visit:    Nail dystrophy    Onychomycosis    Discoloration and thickening of nails both feet    Type 2 diabetes mellitus with diabetic neuropathy, without long-term current use of insulin (HCC)        Patient was seen and evaluated today in clinic.  Chart history reviewed.    - At today's visit sharply debrided nails with a sterile nail nipper achieving a 20% reduction in thickness and length, without incident. Slant back procedure performed to ingrown nails. Nails further smoothed with dremel    - Discussed importance of proper pedal hygiene, regular foot checks, and tight glucose control by following diet and medication regimen.     - Because the patient suffers from neuropathy, pt was educated to keep his feet clean; ensure that he washes and dries between toes.    -Educated the patient on the use of a good moisturizing cream such as urea cream or AmLactin    - Patient to avoid walking barefoot. Recommend ambulating with supportive shoes and inserts.   -Patient advised to remain as active as possible and work with PCP to keep blood sugar in a safe range.   -Patient was educated on HgbA1c and proper blood sugar control as well as risks of heightened A1C value.   -Pt was encouraged to call with any questions or concerns. All questions answered at today’s visit.    -Educated patient  on acute signs of infection. Advised patient to seek immediate medical attention if any concerns arise.  -All of the patient's questions and concerns were addressed.  Patient indicated understanding of these issues and agrees to the plan.        Time spent reviewing pertinent information from patient's chart, reviewing any pertinent imaging, obtaining history and physical exam, discussing and mutually agreeing on a treatment plan, and documenting encounter: 20 minutes    RTC 2-3 months      ROSINA Llanes        [facility]             Dragon speech recognition software was used to prepare this note.  Errors in word recognition may occur.  Please contact me with any questions/concerns with this note.        [1]   Current Outpatient Medications   Medication Sig Dispense Refill    semaglutide (OZEMPIC, 0.25 OR 0.5 MG/DOSE,) 2 MG/3ML Subcutaneous Solution Pen-injector Inject 0.5 mg into the skin once a week. 9 mL 0    rosuvastatin 20 MG Oral Tab Take 1 tablet (20 mg total) by mouth daily. 90 tablet 3    ELIQUIS 5 MG Oral Tab Take 1 tablet (5 mg total) by mouth 2 (two) times daily.      metFORMIN HCl 1000 MG Oral Tab Take 1 tablet (1,000 mg total) by mouth 2 (two) times daily with meals. 180 tablet 3    Sildenafil Citrate 50 MG Oral Tab Take 1 tablet (50 mg total) by mouth daily as needed for Erectile Dysfunction. 10 tablet 3    glipiZIDE ER 10 MG Oral Tablet 24 Hr Take 2 tablets (20 mg total) by mouth every morning. 180 tablet 3    empagliflozin (JARDIANCE) 25 MG Oral Tab Take 1 tablet (25 mg total) by mouth daily. 90 tablet 3    traMADol 50 MG Oral Tab Take 1 tablet (50 mg total) by mouth 2 (two) times daily as needed for Pain. 30 tablet 0    dilTIAZem  MG Oral Capsule SR 24 Hr Take 1 capsule (120 mg total) by mouth daily.      methylPREDNISolone (MEDROL) 4 MG Oral Tablet Therapy Pack As directed. 1 each 0    Calcipotriene 0.005 % External Ointment Apply topically.      fluorouracil 5 % External Cream Apply topically.      ENALAPRIL 2.5 MG Oral Tab TAKE 1 TABLET (2.5 MG TOTAL) BY MOUTH DAILY. 90 tablet 1    METOPROLOL SUCCINATE 100 MG Oral Tablet 24 Hr TAKE 1/2 TABLET BY MOUTH DAILY 45 tablet 1    Clobetasol Propionate (TEMOVATE) 0.05 % Apply Externally Ointment Apply 1 Application topically as needed.  0    Cholecalciferol (VITAMIN D) 1000 UNITS Oral Tab Take 2 tablets by mouth daily.      MULTIVITAMIN OR 1 tab po daily      VITAMIN C OR 500mg po daily      GLUCOSAMINE CHONDROITIN ADV OR 1 tab po daily     [2]   Past Medical History:    Atrial fibrillation (HCC)    Basal cell carcinoma    Basal cell carcinoma of chin    Cellulitis of right lower extremity    Diabetes mellitus (HCC)    Diabetic foot ulcer (HCC)    Goiter    Heart palpitations    History of skin cancer    Moderate mitral regurgitation    Skin cancer    Stopped smoking with greater than 40 pack year history   [3]   Past Surgical History:  Procedure Laterality Date    Colonoscopy      Full graft proc nos,ear,lid <20sqcm  2010    Performed by ANTELMO DEXTER at [facility] [place]    Incision and drainage  2015    R foot wound    Laser surgery of eye  1999    corneal LASIK    Other surgical history      MOHS     Other surgical history  ,    to remove 2 BCC    Tonsillectomy     [4]   Family History  Problem Relation Age of Onset    Other (ALzheimer's Disease) Mother 52         at 69    Other (Liver Cancer) Paternal Grandfather     Other (Lung Cancer) Father          at 80    Other (stroke sydrome) Maternal Grandfather     Other (Cirrhosis) Maternal Aunt          at 69    Diabetes Brother    [5]   Social History  Socioeconomic History    Marital status:    Occupational History    Occupation: Retired railroad executive, consultant   Tobacco Use    Smoking status: Some Days     Types: Cigars    Smokeless tobacco: Never    Tobacco comments:     2 cigars/week   Vaping Use    Vaping status: Never Used   Substance and Sexual Activity    Alcohol use: Yes     Alcohol/week: 4.0 - 5.0 standard drinks of alcohol     Types: 4 - 5 Standard drinks or equivalent per week     Comment: Cage done 19    Drug use: No    Sexual activity: Yes   Other Topics Concern    Caffeine Concern Yes    Exercise Yes

## 2025-08-21 ENCOUNTER — LAB ENCOUNTER (OUTPATIENT)
Dept: LAB | Age: 80
End: 2025-08-21
Attending: PHYSICIAN ASSISTANT

## 2025-08-21 DIAGNOSIS — E04.2 NONTOXIC MULTINODULAR GOITER: ICD-10-CM

## 2025-08-21 DIAGNOSIS — D69.6 THROMBOCYTOPENIA: ICD-10-CM

## 2025-08-21 DIAGNOSIS — E11.40 TYPE 2 DIABETES MELLITUS WITH DIABETIC NEUROPATHY, WITHOUT LONG-TERM CURRENT USE OF INSULIN (HCC): ICD-10-CM

## 2025-08-21 DIAGNOSIS — E78.5 DYSLIPIDEMIA: ICD-10-CM

## 2025-08-21 LAB
ALBUMIN SERPL-MCNC: 4.7 G/DL (ref 3.2–4.8)
ALBUMIN/GLOB SERPL: 2 (ref 1–2)
ALP LIVER SERPL-CCNC: 40 U/L (ref 45–117)
ALT SERPL-CCNC: 24 U/L (ref 10–49)
ANION GAP SERPL CALC-SCNC: 11 MMOL/L (ref 0–18)
AST SERPL-CCNC: 21 U/L (ref ?–34)
BASOPHILS # BLD AUTO: 0.05 X10(3) UL (ref 0–0.2)
BASOPHILS NFR BLD AUTO: 0.7 %
BILIRUB SERPL-MCNC: 0.8 MG/DL (ref 0.2–1.1)
BUN BLD-MCNC: 14 MG/DL (ref 9–23)
CALCIUM BLD-MCNC: 9.4 MG/DL (ref 8.7–10.6)
CHLORIDE SERPL-SCNC: 104 MMOL/L (ref 98–112)
CHOLEST SERPL-MCNC: 141 MG/DL (ref ?–200)
CO2 SERPL-SCNC: 26 MMOL/L (ref 21–32)
CREAT BLD-MCNC: 0.96 MG/DL (ref 0.7–1.3)
CREAT UR-SCNC: 71.7 MG/DL
EGFRCR SERPLBLD CKD-EPI 2021: 79 ML/MIN/1.73M2 (ref 60–?)
EOSINOPHIL # BLD AUTO: 0.31 X10(3) UL (ref 0–0.7)
EOSINOPHIL NFR BLD AUTO: 4.3 %
ERYTHROCYTE [DISTWIDTH] IN BLOOD BY AUTOMATED COUNT: 12.4 %
FASTING PATIENT LIPID ANSWER: YES
FASTING STATUS PATIENT QL REPORTED: YES
GLOBULIN PLAS-MCNC: 2.3 G/DL (ref 2–3.5)
GLUCOSE BLD-MCNC: 172 MG/DL (ref 70–99)
HCT VFR BLD AUTO: 45.9 % (ref 39–53)
HDLC SERPL-MCNC: 49 MG/DL (ref 40–59)
HGB BLD-MCNC: 15.4 G/DL (ref 13–17.5)
IMM GRANULOCYTES # BLD AUTO: 0.02 X10(3) UL (ref 0–1)
IMM GRANULOCYTES NFR BLD: 0.3 %
LDLC SERPL CALC-MCNC: 68 MG/DL (ref ?–100)
LYMPHOCYTES # BLD AUTO: 1.63 X10(3) UL (ref 1–4)
LYMPHOCYTES NFR BLD AUTO: 22.5 %
MCH RBC QN AUTO: 31.6 PG (ref 26–34)
MCHC RBC AUTO-ENTMCNC: 33.6 G/DL (ref 31–37)
MCV RBC AUTO: 94.3 FL (ref 80–100)
MICROALBUMIN UR-MCNC: 0.6 MG/DL
MICROALBUMIN/CREAT 24H UR-RTO: 8.4 UG/MG (ref ?–30)
MONOCYTES # BLD AUTO: 0.6 X10(3) UL (ref 0.1–1)
MONOCYTES NFR BLD AUTO: 8.3 %
NEUTROPHILS # BLD AUTO: 4.62 X10 (3) UL (ref 1.5–7.7)
NEUTROPHILS # BLD AUTO: 4.62 X10(3) UL (ref 1.5–7.7)
NEUTROPHILS NFR BLD AUTO: 63.9 %
NONHDLC SERPL-MCNC: 92 MG/DL (ref ?–130)
OSMOLALITY SERPL CALC.SUM OF ELEC: 297 MOSM/KG (ref 275–295)
PLATELET # BLD AUTO: 180 10(3)UL (ref 150–450)
POTASSIUM SERPL-SCNC: 4.6 MMOL/L (ref 3.5–5.1)
PROT SERPL-MCNC: 7 G/DL (ref 5.7–8.2)
RBC # BLD AUTO: 4.87 X10(6)UL (ref 3.8–5.8)
SODIUM SERPL-SCNC: 141 MMOL/L (ref 136–145)
TRIGL SERPL-MCNC: 141 MG/DL (ref 30–149)
TSI SER-ACNC: 1.38 UIU/ML (ref 0.55–4.78)
VLDLC SERPL CALC-MCNC: 21 MG/DL (ref 0–30)
WBC # BLD AUTO: 7.2 X10(3) UL (ref 4–11)

## 2025-08-21 PROCEDURE — 36415 COLL VENOUS BLD VENIPUNCTURE: CPT

## 2025-08-21 PROCEDURE — 85025 COMPLETE CBC W/AUTO DIFF WBC: CPT

## 2025-08-21 PROCEDURE — 82043 UR ALBUMIN QUANTITATIVE: CPT

## 2025-08-21 PROCEDURE — 80053 COMPREHEN METABOLIC PANEL: CPT

## 2025-08-21 PROCEDURE — 82570 ASSAY OF URINE CREATININE: CPT

## 2025-08-21 PROCEDURE — 84443 ASSAY THYROID STIM HORMONE: CPT

## 2025-08-21 PROCEDURE — 80061 LIPID PANEL: CPT

## 2025-08-25 ENCOUNTER — OFFICE VISIT (OUTPATIENT)
Dept: INTERNAL MEDICINE CLINIC | Facility: CLINIC | Age: 80
End: 2025-08-25

## 2025-08-25 VITALS
OXYGEN SATURATION: 97 % | RESPIRATION RATE: 18 BRPM | HEIGHT: 74 IN | HEART RATE: 80 BPM | DIASTOLIC BLOOD PRESSURE: 66 MMHG | WEIGHT: 182.81 LBS | TEMPERATURE: 97 F | BODY MASS INDEX: 23.46 KG/M2 | SYSTOLIC BLOOD PRESSURE: 112 MMHG

## 2025-08-25 DIAGNOSIS — E78.5 DYSLIPIDEMIA: ICD-10-CM

## 2025-08-25 DIAGNOSIS — I48.20 CHRONIC ATRIAL FIBRILLATION (HCC): ICD-10-CM

## 2025-08-25 DIAGNOSIS — F17.290 CIGAR SMOKER: ICD-10-CM

## 2025-08-25 DIAGNOSIS — I10 ESSENTIAL HYPERTENSION: ICD-10-CM

## 2025-08-25 DIAGNOSIS — E11.40 TYPE 2 DIABETES MELLITUS WITH DIABETIC NEUROPATHY, WITHOUT LONG-TERM CURRENT USE OF INSULIN (HCC): ICD-10-CM

## 2025-08-25 DIAGNOSIS — Z71.6 ENCOUNTER FOR TOBACCO USE CESSATION COUNSELING: ICD-10-CM

## 2025-08-25 DIAGNOSIS — H25.091 AGE-RELATED INCIPIENT CATARACT OF RIGHT EYE: ICD-10-CM

## 2025-08-25 DIAGNOSIS — Z00.00 ROUTINE GENERAL MEDICAL EXAMINATION AT A HEALTH CARE FACILITY: Primary | ICD-10-CM

## 2025-08-25 DIAGNOSIS — Z85.828 HISTORY OF SKIN CANCER: ICD-10-CM

## 2025-08-25 DIAGNOSIS — Z79.01 LONG TERM (CURRENT) USE OF ANTICOAGULANTS: ICD-10-CM

## 2025-08-25 DIAGNOSIS — E04.2 NONTOXIC MULTINODULAR GOITER: ICD-10-CM

## 2025-08-25 DIAGNOSIS — D69.6 THROMBOCYTOPENIA: ICD-10-CM

## 2025-08-25 DIAGNOSIS — I34.0 MODERATE MITRAL REGURGITATION: ICD-10-CM

## (undated) DIAGNOSIS — E11.40 TYPE 2 DIABETES MELLITUS WITH DIABETIC NEUROPATHY, WITHOUT LONG-TERM CURRENT USE OF INSULIN (HCC): ICD-10-CM

## (undated) NOTE — LETTER
02/14/20        [address] [address]      Dear Lashawn Monday,    [facility] records indicate that you have outstanding lab work and or testing that was ordered for you and has not yet been completed:  No orders of the defined type

## (undated) NOTE — LETTER
04/01/19        [address] [address]      Dear Enriqueta Carr,    Our records indicate that you have outstanding lab work and or testing that was ordered for you and has not yet been completed:  Orders Placed This Encounter

## (undated) NOTE — LETTER
10/05/20        [address] [address]      Dear Ryan Angulo,    Our records indicate that you have outstanding lab work and or testing that was ordered for you and has not yet been completed:  Orders Placed This Encounter

## (undated) NOTE — LETTER
06/18/18        [address] [address]      Dear Ryan Angulo,    Our records indicate that you have outstanding lab work and or testing that was ordered for you and has not yet been completed:    Prothrombine Time (PT)    T

## (undated) NOTE — LETTER
04/23/18        [address] [address]      Dear Jaylen Moses,    Our records indicate that you have outstanding lab work and or testing that was ordered for you and has not yet been completed:          Prothrombin Time (PT)

## (undated) NOTE — MR AVS SNAPSHOT
[facility] [address] [address] [phone]               Thank you for choosing us for your health care visit with Kendy Berger MD.  We are glad to serve you and happy to provide you with this summar **REFERRAL REQUEST**    Your physician has referred you to a specialist.  Your physician or the clinic staff will provide you with the phone number you should call to schedule your appointment.      If you are confident that your benefit plan will GlipiZIDE ER 10 MG Tb24   TAKE TWO TABLETS IN THE MORNING DAILY   Commonly known as:  GLIPIZIDE XL           GLUCOSAMINE CHONDROITIN ADV OR   1 tab po daily           INVOKANA 300 MG Tabs   Generic drug:  Canagliflozin   TAKE ONE TABLET BY MOUTH DA MyChart questions? Call [phone] for help. [facility] is NOT to be used for urgent needs. For medical emergencies, dial 911.         Educational Information     Healthy Diet and Regular Exercise  [facility] [address] [place] [address] [employer]

## (undated) NOTE — MR AVS SNAPSHOT
[address], [address] [place] [id number] [phone]               Thank you for choosing us for your health care visit with Phani Neville MD.  We are glad to serve you and happy to provide you with this Generic drug:  DilTIAZem HCl ER Coated Beads   TAKE 1 CAPSULE (180 MG TOTAL) BY MOUTH ONCE DAILY. Clobetasol Propionate 0.05 % Oint   Apply 1 Application topically as needed.    Commonly known as:  TEMOVATE           CRESTOR 20 MG Tabs   Generic d Phone:  [phone]    - Enalapril Maleate 2.5 MG Tabs            MyChart     Visit [employer]  You can access your MyChart to more actively manage your health care and view more details from this visit by going to [email or web address].   If you've

## (undated) NOTE — MR AVS SNAPSHOT
[facility] [address], [address] [address] [phone]               Thank you for choosing us for your health care visit with [facility].   We are glad to serve you and happy to provide you with this summary Assoc Dx:  Routine general medical examination at a health care facility [Z00.00]           TSH [E]    Complete by:   Mar 08, 2017 (Approximate)    Assoc Dx:  Routine general medical examination at a health care facility [Z00.00]           Vitamin D, 25-Hy Take 1 tablet by mouth daily.            GlipiZIDE ER 10 MG Tb24   TAKE TWO TABLETS IN THE MORNING DAILY   Commonly known as:  GLIPIZIDE XL           GLUCOSAMINE CHONDROITIN ADV OR   1 tab po daily           INVOKANA 300 MG Tabs   Generic drug:  Scott Richards Visit [facility] online at  [email or web address]